# Patient Record
Sex: FEMALE | Employment: FULL TIME | URBAN - METROPOLITAN AREA
[De-identification: names, ages, dates, MRNs, and addresses within clinical notes are randomized per-mention and may not be internally consistent; named-entity substitution may affect disease eponyms.]

---

## 2017-04-10 ENCOUNTER — OFFICE VISIT (OUTPATIENT)
Dept: FAMILY MEDICINE | Facility: CLINIC | Age: 32
End: 2017-04-10
Payer: COMMERCIAL

## 2017-04-10 VITALS
HEIGHT: 65 IN | DIASTOLIC BLOOD PRESSURE: 79 MMHG | TEMPERATURE: 98.1 F | SYSTOLIC BLOOD PRESSURE: 130 MMHG | HEART RATE: 71 BPM | BODY MASS INDEX: 27.16 KG/M2 | WEIGHT: 163 LBS

## 2017-04-10 DIAGNOSIS — S16.1XXA STRAIN OF NECK MUSCLE, INITIAL ENCOUNTER: Primary | ICD-10-CM

## 2017-04-10 PROCEDURE — 99213 OFFICE O/P EST LOW 20 MIN: CPT | Performed by: FAMILY MEDICINE

## 2017-04-10 RX ORDER — CYCLOBENZAPRINE HCL 10 MG
10 TABLET ORAL
Qty: 14 TABLET | Refills: 0 | Status: SHIPPED | OUTPATIENT
Start: 2017-04-10 | End: 2017-06-23

## 2017-04-10 RX ORDER — NAPROXEN 500 MG/1
500 TABLET ORAL 2 TIMES DAILY PRN
Qty: 30 TABLET | Refills: 0 | Status: SHIPPED | OUTPATIENT
Start: 2017-04-10 | End: 2017-07-12

## 2017-04-10 NOTE — PROGRESS NOTES
"  SUBJECTIVE:                                                    Tana Kyle is a 32 year old female who presents to clinic today for the following health issues:     L neck and shoulder pain since yesterday      Onset: yesterday     Description:   Location: left shoulder and L neck   Character:  ache and tight     Intensity: 5-6 /10    Progression of Symptoms: better    Accompanying Signs & Symptoms:  Other symptoms: radiation of pain to upper back    History:   Previous similar pain: no       Precipitating factors:   Trauma or overuse: no     Alleviating factors:  Improved by: nothing       Therapies Tried and outcome: Aleve,ice and heat     Took ibuprofen this morning that helped.   Traveling to [place] tomorrow.     Problem list and histories reviewed & adjusted, as indicated.  Additional history: as documented    Patient Active Problem List   Diagnosis     CARDIOVASCULAR SCREENING; LDL GOAL LESS THAN 160     No past surgical history on file.    Social History   Substance Use Topics     Smoking status: Never Smoker     Smokeless tobacco: Not on file     Alcohol use No     Family History   Problem Relation Age of Onset     DIABETES Maternal Grandmother      Cancer - colorectal Maternal Grandmother      Alzheimer Disease Maternal Grandfather      Coronary Artery Disease No family hx of          BP Readings from Last 3 Encounters:   04/10/17 130/79   09/06/16 140/86   02/24/16 118/72    Wt Readings from Last 3 Encounters:   04/10/17 163 lb (73.9 kg)   09/06/16 168 lb (76.2 kg)   07/22/16 172 lb (78 kg)                    Reviewed and updated as needed this visit by clinical staff  Allergies  Meds       Reviewed and updated as needed this visit by Provider         ROS:  Constitutional, HEENT, cardiovascular, pulmonary, gi and gu systems are negative, except as otherwise noted.    OBJECTIVE:                                                    /79  Pulse 71  Temp 98.1  F (36.7  C) (Oral)  Ht 5' 5\" (1.651 " m)  Wt 163 lb (73.9 kg)  BMI 27.12 kg/m2  Body mass index is 27.12 kg/(m^2).  GENERAL: healthy, alert and no distress  NECK: pt holding her neck towards right side. Tenderness : left cervical paraspinal, left trapezius muscle area. Cervical spine movements cause pain/ discomfort in the left side of neck. Cervical spine movements are restricted due to pain.   Bilateral shoulder exam: normal.   Bilateral upper extremity strength is normal.          ASSESSMENT/PLAN:                                                        ICD-10-CM    1. Strain of neck muscle, initial encounter S16.1XXA cyclobenzaprine (FLEXERIL) 10 MG tablet     naproxen (NAPROSYN) 500 MG tablet     Not to drive for 8 hrs after taking flexeril. Do not take ibuprofen and naproxen together.   Ice pack/ warm pack prn.   Simple neck exercise once pain improves.   F/u prn.     Bell Herron MD  [facility]

## 2017-04-10 NOTE — NURSING NOTE
"Chief Complaint   Patient presents with     Pain     L neck, shoulder pain since yesterday        Initial /79  Pulse 71  Temp 98.1  F (36.7  C) (Oral)  Ht 5' 5\" (1.651 m)  Wt 163 lb (73.9 kg)  BMI 27.12 kg/m2 Estimated body mass index is 27.12 kg/(m^2) as calculated from the following:    Height as of this encounter: 5' 5\" (1.651 m).    Weight as of this encounter: 163 lb (73.9 kg).  Medication Reconciliation: complete  Luciana Ha MA    "

## 2017-04-10 NOTE — MR AVS SNAPSHOT
"              After Visit Summary   4/10/2017    Tana Kyle    MRN: [id number]           Patient Information     Date Of Birth          1985        Visit Information        Provider Department      4/10/2017 2:20 PM Bell Herron MD [facility]        Today's Diagnoses     Strain of neck muscle, initial encounter    -  1       Follow-ups after your visit        Who to contact     If you have questions or need follow up information about today's clinic visit or your schedule please contact [facility] directly at [phone].  Normal or non-critical lab and imaging results will be communicated to you by [employer], letter or phone within 4 business days after the clinic has received the results. If you do not hear from us within 7 days, please contact the clinic through [employer] or phone. If you have a critical or abnormal lab result, we will notify you by phone as soon as possible.  Submit refill requests through [employer] or call your pharmacy and they will forward the refill request to us. Please allow 3 business days for your refill to be completed.          Additional Information About Your Visit        MyChart Information     [employer] gives you secure access to your electronic health record. If you see a primary care provider, you can also send messages to your care team and make appointments. If you have questions, please call your primary care clinic.  If you do not have a primary care provider, please call [phone] and they will assist you.        Care EveryWhere ID     This is your Care EveryWhere ID. This could be used by other organizations to access your [place] medical records  [id number]        Your Vitals Were     Pulse Temperature Height BMI (Body Mass Index)          71 98.1  F (36.7  C) (Oral) 5' 5\" (1.651 m) 27.12 kg/m2         Blood Pressure from Last 3 Encounters:   04/10/17 130/79   09/06/16 140/86   02/24/16 118/72    Weight from " Last 3 Encounters:   04/10/17 163 lb (73.9 kg)   09/06/16 168 lb (76.2 kg)   07/22/16 172 lb (78 kg)              Today, you had the following     No orders found for display         Today's Medication Changes          These changes are accurate as of: 4/10/17  2:57 PM.  If you have any questions, ask your nurse or doctor.               Start taking these medicines.        Dose/Directions    cyclobenzaprine 10 MG tablet   Commonly known as:  FLEXERIL   Used for:  Strain of neck muscle, initial encounter   Started by:  Bell Herron MD        Dose:  10 mg   Take 1 tablet (10 mg) by mouth nightly as needed for muscle spasms   Quantity:  14 tablet   Refills:  0       naproxen 500 MG tablet   Commonly known as:  NAPROSYN   Used for:  Strain of neck muscle, initial encounter   Started by:  Bell Herron MD        Dose:  500 mg   Take 1 tablet (500 mg) by mouth 2 times daily as needed for moderate pain   Quantity:  30 tablet   Refills:  0            Where to get your medicines      These medications were sent to [place] [address] [place] [address] [address] [facility] [address] [facility] [address]     Phone:  [phone]     cyclobenzaprine 10 MG tablet    naproxen 500 MG tablet                Primary Care Provider Office Phone #    [facility] [phone]       [address] [place] [address]        Thank you!     Thank you for choosing [facility]  for your care. Our goal is always to provide you with excellent care. Hearing back from our patients is one way we can continue to improve our services. Please take a few minutes to complete the written survey that you may receive in the mail after your visit with us. Thank you!             Your Updated Medication List - Protect others around you: Learn how to safely use, store and throw away your medicines at www.disposemymeds.org.          This list is accurate as of: 4/10/17  2:57 PM.   Always use your most recent med list.                   Brand Name Dispense Instructions for use    cyclobenzaprine 10 MG tablet    FLEXERIL    14 tablet    Take 1 tablet (10 mg) by mouth nightly as needed for muscle spasms       multivitamin, therapeutic with minerals Tabs tablet      Take 1 tablet by mouth daily       naproxen 500 MG tablet    NAPROSYN    30 tablet    Take 1 tablet (500 mg) by mouth 2 times daily as needed for moderate pain       OMEGA-3 FISH OIL PO

## 2017-06-23 ENCOUNTER — TELEPHONE (OUTPATIENT)
Dept: FAMILY MEDICINE | Facility: CLINIC | Age: 32
End: 2017-06-23

## 2017-06-23 ENCOUNTER — OFFICE VISIT (OUTPATIENT)
Dept: FAMILY MEDICINE | Facility: CLINIC | Age: 32
End: 2017-06-23
Payer: COMMERCIAL

## 2017-06-23 VITALS
BODY MASS INDEX: 26.96 KG/M2 | WEIGHT: 162 LBS | DIASTOLIC BLOOD PRESSURE: 73 MMHG | TEMPERATURE: 97.6 F | SYSTOLIC BLOOD PRESSURE: 129 MMHG | HEART RATE: 72 BPM

## 2017-06-23 DIAGNOSIS — Z30.9 ENCOUNTER FOR CONTRACEPTIVE MANAGEMENT, UNSPECIFIED TYPE: Primary | ICD-10-CM

## 2017-06-23 DIAGNOSIS — Z30.09 ENCOUNTER FOR OTHER GENERAL COUNSELING OR ADVICE ON CONTRACEPTION: ICD-10-CM

## 2017-06-23 DIAGNOSIS — Z00.00 ROUTINE GENERAL MEDICAL EXAMINATION AT A HEALTH CARE FACILITY: Primary | ICD-10-CM

## 2017-06-23 DIAGNOSIS — Z11.3 SCREEN FOR STD (SEXUALLY TRANSMITTED DISEASE): ICD-10-CM

## 2017-06-23 PROCEDURE — 86780 TREPONEMA PALLIDUM: CPT | Performed by: FAMILY MEDICINE

## 2017-06-23 PROCEDURE — 87389 HIV-1 AG W/HIV-1&-2 AB AG IA: CPT | Performed by: FAMILY MEDICINE

## 2017-06-23 PROCEDURE — 87340 HEPATITIS B SURFACE AG IA: CPT | Performed by: FAMILY MEDICINE

## 2017-06-23 PROCEDURE — 86803 HEPATITIS C AB TEST: CPT | Performed by: FAMILY MEDICINE

## 2017-06-23 PROCEDURE — 99395 PREV VISIT EST AGE 18-39: CPT | Performed by: FAMILY MEDICINE

## 2017-06-23 PROCEDURE — 87591 N.GONORRHOEAE DNA AMP PROB: CPT | Performed by: FAMILY MEDICINE

## 2017-06-23 PROCEDURE — 36415 COLL VENOUS BLD VENIPUNCTURE: CPT | Performed by: FAMILY MEDICINE

## 2017-06-23 PROCEDURE — 87491 CHLMYD TRACH DNA AMP PROBE: CPT | Performed by: FAMILY MEDICINE

## 2017-06-23 PROCEDURE — 86706 HEP B SURFACE ANTIBODY: CPT | Performed by: FAMILY MEDICINE

## 2017-06-23 RX ORDER — NORGESTIMATE AND ETHINYL ESTRADIOL 0.25-0.035
1 KIT ORAL DAILY
Qty: 84 TABLET | Refills: 0 | Status: SHIPPED | OUTPATIENT
Start: 2017-06-23 | End: 2017-07-12

## 2017-06-23 NOTE — PROGRESS NOTES
SUBJECTIVE:     CC: Tana Kyle is an 32 year old woman who presents for preventive health visit.     Healthy Habits:    Do you get at least three servings of calcium containing foods daily (dairy, green leafy vegetables, etc.)? yes    Amount of exercise or daily activities, outside of work: yes    Problems taking medications regularly No    Medication side effects: No    Have you had an eye exam in the past two years? yes    Do you see a dentist twice per year? no    Do you have sleep apnea, excessive snoring or daytime drowsiness?no       STD CHECK AND BC DISCUSSION.     Unprotected sexual intercourse last weekend, plan B 5 days ago.   No vaginal symptoms.   Desires IUD.     Today's PHQ-2 Score:   PHQ-2 ( 1999 Pfizer) 2/24/2016 2/5/2016   Q1: Little interest or pleasure in doing things 0 0   Q2: Feeling down, depressed or hopeless 0 0   PHQ-2 Score 0 0       Abuse: Current or Past(Physical, Sexual or Emotional)- No  Do you feel safe in your environment - Yes    Social History   Substance Use Topics     Smoking status: Never Smoker     Smokeless tobacco: Not on file     Alcohol use No     The patient does not drink >3 drinks per day nor >7 drinks per week.    Recent Labs   Lab Test  02/24/16   0937   CHOL  139   HDL  63   LDL  69   TRIG  34   NHDL  76       Reviewed orders with patient.  Reviewed health maintenance and updated orders accordingly - Yes    Mammo Decision Support:  Mammogram not appropriate for this patient based on age.    Pertinent mammograms are reviewed under the imaging tab.  History of abnormal Pap smear: NO - age 30-65 PAP every 5 years with negative HPV co-testing recommended    Reviewed and updated as needed this visit by clinical staff         Reviewed and updated as needed this visit by Provider            ROS:  C: NEGATIVE for fever, chills, change in weight  I: NEGATIVE for worrisome rashes, moles or lesions  E: NEGATIVE for vision changes or irritation  ENT: NEGATIVE for ear, mouth  and throat problems  R: NEGATIVE for significant cough or SOB  B: NEGATIVE for masses, tenderness or discharge  CV: NEGATIVE for chest pain, palpitations or peripheral edema  GI: NEGATIVE for nausea, abdominal pain, heartburn, or change in bowel habits  : NEGATIVE for unusual urinary or vaginal symptoms. Periods are regular.  M: NEGATIVE for significant arthralgias or myalgia  N: NEGATIVE for weakness, dizziness or paresthesias  P: NEGATIVE for changes in mood or affect    Problem list, Medication list, Allergies, and Medical/Social/Surgical histories reviewed in [facility] and updated as appropriate.  OBJECTIVE:     /73  Pulse 72  Temp 97.6  F (36.4  C) (Oral)  Wt 162 lb (73.5 kg)  LMP 05/29/2017  BMI 26.96 kg/m2  EXAM:  GENERAL: healthy, alert and no distress  EYES: Eyes grossly normal to inspection, PERRL and conjunctivae and sclerae normal  HENT: ear canals and TM's normal, nose and mouth without ulcers or lesions  NECK: no adenopathy, no asymmetry, masses, or scars and thyroid normal to palpation  RESP: lungs clear to auscultation - no rales, rhonchi or wheezes  BREAST: normal without masses, tenderness or nipple discharge and no palpable axillary masses or adenopathy  CV: regular rate and rhythm, normal S1 S2, no S3 or S4, no murmur, click or rub, no peripheral edema and peripheral pulses strong  ABDOMEN: soft, nontender, no hepatosplenomegaly, no masses and bowel sounds normal  MS: no gross musculoskeletal defects noted, no edema  SKIN: no suspicious lesions or rashes  NEURO: Normal strength and tone, mentation intact and speech normal  PSYCH: mentation appears normal, affect normal/bright    ASSESSMENT/PLAN:         ICD-10-CM    1. Routine general medical examination at a health care facility Z00.00 norgestimate-ethinyl estradiol (ORTHO-CYCLEN, SPRINTEC) 0.25-35 MG-MCG per tablet   2. Encounter for other general counseling or advice on contraception Z30.09 OB/GYN REFERRAL     norgestimate-ethinyl  "estradiol (ORTHO-CYCLEN, SPRINTEC) 0.25-35 MG-MCG per tablet   3. Screen for STD (sexually transmitted disease) Z11.3 NEISSERIA GONORRHOEA PCR     CHLAMYDIA TRACHOMATIS PCR     HIV Antigen Antibody Combo     Hepatitis B surface antigen     Hepatitis B Surface Antibody     Hepatitis C Screen Reflex to HCV RNA Quant and Genotype     Anti Treponema       Start OCPills, condom use or abstinence from sex for first 2 weeks of BC pills.   Home pregnancy test is periods do not resume before starting next months pill pack.     COUNSELING:   Reviewed preventive health counseling, as reflected in patient instructions       reports that she has never smoked. She does not have any smokeless tobacco history on file.    Estimated body mass index is 27.12 kg/(m^2) as calculated from the following:    Height as of 4/10/17: 5' 5\" (1.651 m).    Weight as of 4/10/17: 163 lb (73.9 kg).       Counseling Resources:  ATP IV Guidelines  Pooled Cohorts Equation Calculator  Breast Cancer Risk Calculator  FRAX Risk Assessment  ICSI Preventive Guidelines  Dietary Guidelines for Americans, 2010  USDA's MyPlate  ASA Prophylaxis  Lung CA Screening    Bell Herron MD  [facility]  "

## 2017-06-23 NOTE — MR AVS SNAPSHOT
After Visit Summary   6/23/2017    Tana Kyle    MRN: [id number]           Patient Information     Date Of Birth          1985        Visit Information        Provider Department      6/23/2017 9:00 AM Bell Herron MD [facility]        Today's Diagnoses     Routine general medical examination at a health care facility    -  1    Encounter for other general counseling or advice on contraception        Screen for STD (sexually transmitted disease)          Care Instructions      Preventive Health Recommendations  Female Ages 26 - 39  Yearly exam:   See your health care provider every year in order to    Review health changes.     Discuss preventive care.      Review your medicines if you your doctor has prescribed any.    Until age 30: Get a Pap test every three years (more often if you have had an abnormal result).    After age 30: Talk to your doctor about whether you should have a Pap test every 3 years or have a Pap test with HPV screening every 5 years.   You do not need a Pap test if your uterus was removed (hysterectomy) and you have not had cancer.  You should be tested each year for STDs (sexually transmitted diseases), if you're at risk.   Talk to your provider about how often to have your cholesterol checked.  If you are at risk for diabetes, you should have a diabetes test (fasting glucose).  Shots: Get a flu shot each year. Get a tetanus shot every 10 years.   Nutrition:     Eat at least 5 servings of fruits and vegetables each day.    Eat whole-grain bread, whole-wheat pasta and brown rice instead of white grains and rice.    Talk to your provider about Calcium and Vitamin D.     Lifestyle    Exercise at least 150 minutes a week (30 minutes a day, 5 days of the week). This will help you control your weight and prevent disease.    Limit alcohol to one drink per day.    No smoking.     Wear sunscreen to prevent skin cancer.    See your dentist every  six months for an exam and cleaning.      You can start taking your oral contraceptive pills from Today. Use condoms as well for next 2 weeks .             Follow-ups after your visit        Additional Services     OB/GYN REFERRAL       Your provider has referred you to:  [facility]: [facility] [place] [phone]   [email or web address]    Please be aware that coverage of these services is subject to the terms and limitations of your health insurance plan.  Call member services at your health plan with any benefit or coverage questions.      Please bring the following with you to your appointment:    (1) Any X-Rays, CTs or MRIs which have been performed.  Contact the facility where they were done to arrange for  prior to your scheduled appointment.   (2) List of current medications   (3) This referral request   (4) Any documents/labs given to you for this referral                  Who to contact     If you have questions or need follow up information about today's clinic visit or your schedule please contact [facility] directly at [phone].  Normal or non-critical lab and imaging results will be communicated to you by MyChart, letter or phone within 4 business days after the clinic has received the results. If you do not hear from us within 7 days, please contact the clinic through [employer] or phone. If you have a critical or abnormal lab result, we will notify you by phone as soon as possible.  Submit refill requests through [employer] or call your pharmacy and they will forward the refill request to us. Please allow 3 business days for your refill to be completed.          Additional Information About Your Visit        [employer] Information     [employer] gives you secure access to your electronic health record. If you see a primary care provider, you can also send messages to your care team and make appointments. If you have questions, please call your  primary care clinic.  If you do not have a primary care provider, please call [phone] and they will assist you.        Care EveryWhere ID     This is your Care EveryWhere ID. This could be used by other organizations to access your [place] medical records  [id number]        Your Vitals Were     Pulse Temperature Last Period BMI (Body Mass Index)          72 97.6  F (36.4  C) (Oral) 05/29/2017 26.96 kg/m2         Blood Pressure from Last 3 Encounters:   06/23/17 129/73   04/10/17 130/79   09/06/16 140/86    Weight from Last 3 Encounters:   06/23/17 162 lb (73.5 kg)   04/10/17 163 lb (73.9 kg)   09/06/16 168 lb (76.2 kg)              We Performed the Following     Anti Treponema     CHLAMYDIA TRACHOMATIS PCR     Hepatitis B Surface Antibody     Hepatitis B surface antigen     Hepatitis C Screen Reflex to HCV RNA Quant and Genotype     HIV Antigen Antibody Combo     NEISSERIA GONORRHOEA PCR     OB/GYN REFERRAL          Today's Medication Changes          These changes are accurate as of: 6/23/17 10:10 AM.  If you have any questions, ask your nurse or doctor.               Start taking these medicines.        Dose/Directions    norgestimate-ethinyl estradiol 0.25-35 MG-MCG per tablet   Commonly known as:  ORTHO-CYCLEN, SPRINTEC   Used for:  Routine general medical examination at a health care facility, Encounter for other general counseling or advice on contraception   Started by:  Bell Herron MD        Dose:  1 tablet   Take 1 tablet by mouth daily   Quantity:  84 tablet   Refills:  0            Where to get your medicines      These medications were sent to [place] [id number] [facility] [place] - [address]  [address] [place] [address]     Phone:  [phone]     norgestimate-ethinyl estradiol 0.25-35 MG-MCG per tablet                Primary Care Provider Office Phone # Fax #    Bell Herron [phone]       [facility] [address]  [place]  [facility] [address]        Equal Access to Services     [facility] HospitalIRLANDA : Hadii lynette wallace davidebony Deanaali, wasarbjitda luqadaha, qaybta kaalmada nina, wilfrid rossi. So [facility] [phone].    ATENCIÓN: Si habla español, tiene a gómez disposición servicios gratuitos de asistencia lingüística. Yoanna al [phone].    We comply with applicable federal civil rights laws and [place] laws. We do not discriminate on the basis of race, color, national origin, age, disability sex, sexual orientation or gender identity.            Thank you!     Thank you for choosing [facility]  for your care. Our goal is always to provide you with excellent care. Hearing back from our patients is one way we can continue to improve our services. Please take a few minutes to complete the written survey that you may receive in the mail after your visit with us. Thank you!             Your Updated Medication List - Protect others around you: Learn how to safely use, store and throw away your medicines at www.disposemymeds.org.          This list is accurate as of: 6/23/17 10:10 AM.  Always use your most recent med list.                   Brand Name Dispense Instructions for use Diagnosis    multivitamin, therapeutic with minerals Tabs tablet      Take 1 tablet by mouth daily        naproxen 500 MG tablet    NAPROSYN    30 tablet    Take 1 tablet (500 mg) by mouth 2 times daily as needed for moderate pain    Strain of neck muscle, initial encounter       norgestimate-ethinyl estradiol 0.25-35 MG-MCG per tablet    ORTHO-CYCLEN, SPRINTEC    84 tablet    Take 1 tablet by mouth daily    Routine general medical examination at a health care facility, Encounter for other general counseling or advice on contraception       OMEGA-3 FISH OIL PO

## 2017-06-23 NOTE — PATIENT INSTRUCTIONS

## 2017-06-23 NOTE — TELEPHONE ENCOUNTER
Pt called wondering if the referral to see an OBGYN would be valid for a different [place] location as it specifies that the referral was for the [place] location. Pt would prefer to see a female as the provider at [place] is a male. She would like a call back if possible anytime after 3:30PM.    Fei Gomez

## 2017-06-23 NOTE — NURSING NOTE
"Chief Complaint   Patient presents with     Physical     BC discussion      STD       Initial /73  Pulse 72  Temp 97.6  F (36.4  C) (Oral)  Wt 162 lb (73.5 kg)  LMP 05/29/2017  BMI 26.96 kg/m2 Estimated body mass index is 26.96 kg/(m^2) as calculated from the following:    Height as of 4/10/17: 5' 5\" (1.651 m).    Weight as of this encounter: 162 lb (73.5 kg).  Medication Reconciliation: complete  Luciana Ha MA    "

## 2017-06-24 LAB — T PALLIDUM IGG+IGM SER QL: NEGATIVE

## 2017-06-25 LAB
C TRACH DNA SPEC QL NAA+PROBE: NORMAL
N GONORRHOEA DNA SPEC QL NAA+PROBE: NORMAL
SPECIMEN SOURCE: NORMAL
SPECIMEN SOURCE: NORMAL

## 2017-06-26 LAB
HBV SURFACE AB SERPL IA-ACNC: ABNORMAL M[IU]/ML
HBV SURFACE AG SERPL QL IA: NONREACTIVE
HCV AB SERPL QL IA: NORMAL
HIV 1+2 AB+HIV1 P24 AG SERPL QL IA: NORMAL

## 2017-06-26 NOTE — TELEPHONE ENCOUNTER
Referral placed in for various [place] locations, pt to call and schedule appointment with female provider. If they do not have one available, I believe,  should be able to transfer her to the location that has female provider.     Bell Herron MD.   Family Physician.  [facility].

## 2017-06-26 NOTE — TELEPHONE ENCOUNTER
Informed patient referral has been placed and she can contact clinic to schedule appointment. PERNELL Lacy

## 2017-06-26 NOTE — TELEPHONE ENCOUNTER
Please see message below, patient would like OB/GYN referral re-done to allow different [facility] locations, preferably one with a women practitioner.    Routed to PCP.    Britney Abdalla RN  [facility]

## 2017-06-26 NOTE — PROGRESS NOTES
Dear Tana Kyle,     Your recent labs are NEGATIVE for STDs.     You are immunized against Hepatitis B.     Bell Herron MD.   Family Physician.  [facility].

## 2017-07-12 ENCOUNTER — OFFICE VISIT (OUTPATIENT)
Dept: FAMILY MEDICINE | Facility: CLINIC | Age: 32
End: 2017-07-12
Payer: COMMERCIAL

## 2017-07-12 VITALS
WEIGHT: 165 LBS | TEMPERATURE: 99.4 F | SYSTOLIC BLOOD PRESSURE: 121 MMHG | HEART RATE: 66 BPM | BODY MASS INDEX: 27.46 KG/M2 | OXYGEN SATURATION: 100 % | DIASTOLIC BLOOD PRESSURE: 76 MMHG

## 2017-07-12 DIAGNOSIS — H65.23 BILATERAL CHRONIC SEROUS OTITIS MEDIA: Primary | ICD-10-CM

## 2017-07-12 PROCEDURE — 99213 OFFICE O/P EST LOW 20 MIN: CPT | Performed by: INTERNAL MEDICINE

## 2017-07-12 RX ORDER — FLUTICASONE PROPIONATE 50 MCG
1-2 SPRAY, SUSPENSION (ML) NASAL DAILY
Qty: 3 BOTTLE | Refills: 11 | Status: SHIPPED | OUTPATIENT
Start: 2017-07-12 | End: 2020-01-03

## 2017-07-12 RX ORDER — MECLIZINE HYDROCHLORIDE 25 MG/1
25 TABLET ORAL EVERY 6 HOURS PRN
Qty: 30 TABLET | Refills: 1 | Status: SHIPPED | OUTPATIENT
Start: 2017-07-12 | End: 2018-02-27

## 2017-07-12 ASSESSMENT — PAIN SCALES - GENERAL: PAINLEVEL: MODERATE PAIN (4)

## 2017-07-12 NOTE — PROGRESS NOTES
SUBJECTIVE:                                                    Tana Kyle is a 32 year old female who presents to clinic today for the following health issues:  Vertigo present  Flying during this time   Niece.   No water.     Recurrent Otitis.   No tube in the ear.   Vertigo.     Migraines ( many years ago)     ENT Symptoms             Symptoms: cc Present Absent Comment   Fever/Chills   x    Fatigue   x    Muscle Aches   x    Eye Irritation   x    Sneezing   x    Nasal Porfirio/Drg   x    Sinus Pressure/Pain   x    Loss of smell   x    Dental pain   x    Sore Throat   x    Swollen Glands   x    Ear Pain/Fullness  x  Right ear, doesn't feels normal   Cough   x    Wheeze   x    Chest Pain   x    Shortness of breath   x    Rash   x    Other  x  Dizziness     Symptom duration:  4 days    Symptom severity:  moderate   Treatments tried:  Nothing   Contacts:  Nephew has strep             Problem list and histories reviewed & adjusted, as indicated.  Additional history: as documented    Patient Active Problem List   Diagnosis     CARDIOVASCULAR SCREENING; LDL GOAL LESS THAN 160     History reviewed. No pertinent surgical history.    Social History   Substance Use Topics     Smoking status: Never Smoker     Smokeless tobacco: Not on file     Alcohol use No     Family History   Problem Relation Age of Onset     DIABETES Maternal Grandmother      Cancer - colorectal Maternal Grandmother      Alzheimer Disease Maternal Grandfather      Coronary Artery Disease No family hx of          Current Outpatient Prescriptions   Medication Sig Dispense Refill     meclizine (ANTIVERT) 25 MG tablet Take 1 tablet (25 mg) by mouth every 6 hours as needed for dizziness 30 tablet 1     fluticasone (FLONASE) 50 MCG/ACT spray Spray 1-2 sprays into both nostrils daily 3 Bottle 11     multivitamin, therapeutic with minerals (THERA-VIT-M) TABS Take 1 tablet by mouth daily       Omega-3 Fatty Acids (OMEGA-3 FISH OIL PO)        No Known  Allergies  Recent Labs   Lab Test  02/24/16   0937  05/25/10   1053   LDL  69   --    HDL  63   --    TRIG  34   --    TSH   --   1.57        Reviewed and updated as needed this visit by clinical staff  Tobacco  Allergies  Meds  Med Hx  Surg Hx  Fam Hx  Soc Hx      Reviewed and updated as needed this visit by Provider         ROS:  Constitutional, HEENT, cardiovascular, pulmonary, gi and gu systems are negative, except as otherwise noted.    OBJECTIVE:     /76 (BP Location: Left arm, Patient Position: Chair, Cuff Size: Adult Regular)  Pulse 66  Temp 99.4  F (37.4  C) (Oral)  Wt 165 lb (74.8 kg)  LMP 05/29/2017  SpO2 100%  Breastfeeding? No  BMI 27.46 kg/m2  Body mass index is 27.46 kg/(m^2).  GENERAL: healthy, alert and no distress  NECK: no adenopathy, no asymmetry, masses, or scars and thyroid normal to palpation  Tm bilateral serrous otitis - no redness no pus    RESP: lungs clear to auscultation - no rales, rhonchi or wheezes  CV: regular rate and rhythm, normal S1 S2, no S3 or S4, no murmur, click or rub, no peripheral edema and peripheral pulses strong  ABDOMEN: soft, nontender, no hepatosplenomegaly, no masses and bowel sounds normal  MS: no gross musculoskeletal defects noted, no edema    Diagnostic Test Results:  Results for orders placed or performed in visit on 06/23/17   HIV Antigen Antibody Combo   Result Value Ref Range    HIV Antigen Antibody Combo  NR     Nonreactive   HIV-1 p24 Ag & HIV-1/HIV-2 Ab Not Detected     Hepatitis B surface antigen   Result Value Ref Range    Hep B Surface Agn Nonreactive NR   Hepatitis B Surface Antibody   Result Value Ref Range    Hepatitis B Surface Antibody (H) <8.00 m[IU]/mL     >1000.00  Reactive, Patient is considered to be immune to infection with hepatitis B when   the value is greater than or equal to 12.0 m[IU]/mL.     Hepatitis C Screen Reflex to HCV RNA Quant and Genotype   Result Value Ref Range    Hepatitis C Antibody  NR     Nonreactive    Assay performance characteristics have not been established for newborns,   infants, and children     Anti Treponema   Result Value Ref Range    Treponema pallidum Antibody Negative NEG   NEISSERIA GONORRHOEA PCR   Result Value Ref Range    Specimen Descrip Urine     N Gonorrhea PCR  NEG     Negative   Negative for N. gonorrhoeae rRNA by transcription mediated amplification.   A negative result by transcription mediated amplification does not preclude the   presence of N. gonorrhoeae infection because results are dependent on proper   and adequate collection, absence of inhibitors, and sufficient rRNA to be   detected.     CHLAMYDIA TRACHOMATIS PCR   Result Value Ref Range    Specimen Description Urine     Chlamydia Trachomatis PCR  NEG     Negative   Negative for C. trachomatis rRNA by transcription mediated amplification.   A negative result by transcription mediated amplification does not preclude the   presence of C. trachomatis infection because results are dependent on proper   and adequate collection, absence of inhibitors, and sufficient rRNA to be   detected.         ASSESSMENT/PLAN:       ICD-10-CM    1. Bilateral chronic serous otitis media H65.23 meclizine (ANTIVERT) 25 MG tablet     fluticasone (FLONASE) 50 MCG/ACT spray             Anjel Gutierrez MD  [facility]

## 2017-07-12 NOTE — MR AVS SNAPSHOT
After Visit Summary   7/12/2017    Tana Kyle    MRN: [id number]           Patient Information     Date Of Birth          1985        Visit Information        Provider Department      7/12/2017 10:20 AM Anjel Gutierrez MD [facility]        Today's Diagnoses     Bilateral chronic serous otitis media    -  1       Follow-ups after your visit        Your next 10 appointments already scheduled     Jul 14, 2017 11:15 AM CDT   SHORT with Delia Hunter MD   [facility] [place] [facility] [place] [facility]    [address] [place] [id number] [phone]              Who to contact     If you have questions or need follow up information about today's clinic visit or your schedule please contact [facility] directly at [phone].  Normal or non-critical lab and imaging results will be communicated to you by MyChart, letter or phone within 4 business days after the clinic has received the results. If you do not hear from us within 7 days, please contact the clinic through MyChart or phone. If you have a critical or abnormal lab result, we will notify you by phone as soon as possible.  Submit refill requests through [employer] or call your pharmacy and they will forward the refill request to us. Please allow 3 business days for your refill to be completed.          Additional Information About Your Visit        MyChart Information     [employer] gives you secure access to your electronic health record. If you see a primary care provider, you can also send messages to your care team and make appointments. If you have questions, please call your primary care clinic.  If you do not have a primary care provider, please call [phone] and they will assist you.        Care EveryWhere ID     This is your Care EveryWhere ID. This could be used by other organizations to access your [place] medical records  [id number]         Your Vitals Were     Pulse Temperature Last Period Pulse Oximetry Breastfeeding? BMI (Body Mass Index)    66 99.4  F (37.4  C) (Oral) 05/29/2017 100% No 27.46 kg/m2       Blood Pressure from Last 3 Encounters:   07/12/17 121/76   06/23/17 129/73   04/10/17 130/79    Weight from Last 3 Encounters:   07/12/17 165 lb (74.8 kg)   06/23/17 162 lb (73.5 kg)   04/10/17 163 lb (73.9 kg)              Today, you had the following     No orders found for display         Today's Medication Changes          These changes are accurate as of: 7/12/17 10:53 AM.  If you have any questions, ask your nurse or doctor.               Start taking these medicines.        Dose/Directions    fluticasone 50 MCG/ACT spray   Commonly known as:  FLONASE   Used for:  Bilateral chronic serous otitis media   Started by:  Anjel Gutierrez MD        Dose:  1-2 spray   Spray 1-2 sprays into both nostrils daily   Quantity:  3 Bottle   Refills:  11       meclizine 25 MG tablet   Commonly known as:  ANTIVERT   Used for:  Bilateral chronic serous otitis media   Started by:  Anjel Gutierrez MD        Dose:  25 mg   Take 1 tablet (25 mg) by mouth every 6 hours as needed for dizziness   Quantity:  30 tablet   Refills:  1            Where to get your medicines      These medications [facility] [place] [address] [facility] [address]  [address] [facility] [address]     Phone:  [phone]     fluticasone 50 MCG/ACT spray    meclizine 25 MG tablet                Primary Care Provider Office Phone # Fax #    Bell Herron [phone]       [facility] [address]  [facility] [place] [address]        Equal Access to Services     ENDY BOYCE AH: Jenae Jimenez, nino olivo, austen kaalmada nina, wilfrid rossi. So [facility] [phone].    ATENCIÓN: Si habla español, tiene a gómez disposición servicios gratuitos de asistencia lingüística. Llame al  [phone].    We comply with applicable federal civil rights laws and [place] laws. We do not discriminate on the basis of race, color, national origin, age, disability sex, sexual orientation or gender identity.            Thank you!     Thank you for choosing [facility]  for your care. Our goal is always to provide you with excellent care. Hearing back from our patients is one way we can continue to improve our services. Please take a few minutes to complete the written survey that you may receive in the mail after your visit with us. Thank you!             Your Updated Medication List - Protect others around you: Learn how to safely use, store and throw away your medicines at www.disposemymeds.org.          This list is accurate as of: 7/12/17 10:53 AM.  Always use your most recent med list.                   Brand Name Dispense Instructions for use Diagnosis    fluticasone 50 MCG/ACT spray    FLONASE    3 Bottle    Spray 1-2 sprays into both nostrils daily    Bilateral chronic serous otitis media       meclizine 25 MG tablet    ANTIVERT    30 tablet    Take 1 tablet (25 mg) by mouth every 6 hours as needed for dizziness    Bilateral chronic serous otitis media       multivitamin, therapeutic with minerals Tabs tablet      Take 1 tablet by mouth daily        OMEGA-3 FISH OIL PO

## 2017-07-12 NOTE — NURSING NOTE
"Chief Complaint   Patient presents with     Ear Problem       Initial /76 (BP Location: Left arm, Patient Position: Chair, Cuff Size: Adult Regular)  Pulse 66  Temp 99.4  F (37.4  C) (Oral)  Wt 165 lb (74.8 kg)  LMP 05/29/2017  SpO2 100%  Breastfeeding? No  BMI 27.46 kg/m2 Estimated body mass index is 27.46 kg/(m^2) as calculated from the following:    Height as of 4/10/17: 5' 5\" (1.651 m).    Weight as of this encounter: 165 lb (74.8 kg).  Medication Reconciliation: complete   Dina Vang MA      "

## 2017-07-14 ENCOUNTER — OFFICE VISIT (OUTPATIENT)
Dept: OBGYN | Facility: CLINIC | Age: 32
End: 2017-07-14
Payer: COMMERCIAL

## 2017-07-14 VITALS
HEART RATE: 78 BPM | SYSTOLIC BLOOD PRESSURE: 136 MMHG | DIASTOLIC BLOOD PRESSURE: 83 MMHG | BODY MASS INDEX: 27.46 KG/M2 | TEMPERATURE: 98 F | WEIGHT: 165 LBS

## 2017-07-14 DIAGNOSIS — Z30.430 ENCOUNTER FOR IUD INSERTION: Primary | ICD-10-CM

## 2017-07-14 LAB — BETA HCG QUAL IFA URINE: NEGATIVE

## 2017-07-14 PROCEDURE — 84703 CHORIONIC GONADOTROPIN ASSAY: CPT | Performed by: OBSTETRICS & GYNECOLOGY

## 2017-07-14 PROCEDURE — 58300 INSERT INTRAUTERINE DEVICE: CPT | Performed by: OBSTETRICS & GYNECOLOGY

## 2017-07-14 NOTE — NURSING NOTE
"Chief Complaint   Patient presents with     IUD     NDC: 74848-755-03 LOT#: [id number] EXP: 02/20       Initial /83  Pulse 78  Temp 98  F (36.7  C) (Oral)  Wt 165 lb (74.8 kg)  LMP 06/28/2017 (Exact Date)  Breastfeeding? No  BMI 27.46 kg/m2 Estimated body mass index is 27.46 kg/(m^2) as calculated from the following:    Height as of 4/10/17: 5' 5\" (1.651 m).    Weight as of this encounter: 165 lb (74.8 kg).  BP completed using cuff size: regular    No obstetric history on file.    The following HM Due: NONE      The following patient reported/Care Every where data was sent to:  P ABSTRACT QUALITY INITIATIVES [[id number]]  na     n/a             "

## 2017-07-14 NOTE — PROGRESS NOTES
GYN clinic visit  7/14/2017    CC: IUD insertion    HPI:   Tana Kyle is a 32 year old G0 who presents to clinic today for an IUD insertion. Interested in Mirena IUD bc her sister has one.  She has used condoms for contraception in the past. She has a history of no STIs and most recently had negative testing for GC/chlamydia on 6/23/17. She denies current abnormal vaginal discharge. Her LMP was Patient's last menstrual period was 06/28/2017 (exact date).. Her menses are regular, every 28-30 days for 4-5 days of moderte flow. Last Pap smear was 2/4/16.     Past Medical History:   Diagnosis Date     Eczema      Migraines     Immitrex     Recurrent acute otitis media      History reviewed. No pertinent surgical history.    Current Outpatient Prescriptions   Medication     levonorgestrel (MIRENA, 52 MG,) 20 MCG/24HR IUD     meclizine (ANTIVERT) 25 MG tablet     fluticasone (FLONASE) 50 MCG/ACT spray     multivitamin, therapeutic with minerals (THERA-VIT-M) TABS     Omega-3 Fatty Acids (OMEGA-3 FISH OIL PO)     No current facility-administered medications for this visit.       No Known Allergies    Social history:  Works as  for [employer]. Feels safe.   Social History   Substance Use Topics     Smoking status: Never Smoker     Smokeless tobacco: Not on file     Alcohol use No     Family History   Problem Relation Age of Onset     DIABETES Maternal Grandmother      Cancer - colorectal Maternal Grandmother      Alzheimer Disease Maternal Grandfather      Coronary Artery Disease No family hx of        OBJECTIVE:  Vitals:    07/14/17 1105   BP: 136/83   Pulse: 78   Temp: 98  F (36.7  C)   TempSrc: Oral   Weight: 165 lb (74.8 kg)     Body mass index is 27.46 kg/(m^2).    Gen: alert, oriented, no distress, cooperative and pleasant  Abd: soft, nontender, nondistended, no masses  : normal external genitalia without lesions or abnormalities. Normal Bartholin's, normal [place]'s, normal urethra. Normal vaginal  mucosa, no abnormal discharge or lesions. Cervix appears nulliparous, no lesions or erythema. Bimanual exam reveals 8 week sized anteverted mobile uterus, no cervical motion tenderness, no uterine tenderness, no adnexal masses.    PROCEDURE:  Patient has verbalized understanding of risks and benefits. She was counseled on risks of infection, bleeding, uterine perforation, cervical laceration, expulsion, overall risk of pregnancy 2 in 1000, if she does get pregnant that there is an increased risk of ectopic pregnancy. All questions answered. She has signed the consent form.    Urine pregnancy test was negative.      A medium gunner speculum was placed in the vagina with good visualization of the cervix.  The cervix was then swabbed with a betadine x3.  Tenaculum was placed at the 12 o'clock position on the cervix and the uterus sounded to 8.5cm.  The Mirena  IUD was then placed in the usual fashion under sterile technique without difficulty.  Strings were clipped about 2-3 cm from the cervical os.  Tenaculum was removed and cervix was hemostatic. There were no complications. The patient tolerated the procedure well.  NDC: 72293-443-60 LOT#: [id number] EXP: 02/20    ASSESSMENT:   Tana Kyle is a 32 year old nulligravid female who had a Mirena IUD inserted today without complication.    PLAN:  1. Encounter for IUD insertion  - Beta HCG qual IFA urine  - INSERTION INTRAUTERINE DEVICE  - HC LEVONORGESTREL IU 52MG 5 YR  - levonorgestrel (MIRENA, 52 MG,) 20 MCG/24HR IUD; 1 each (20 mcg) by Intrauterine route once for 1 dose  Dispense: 1 each; Refill: 0      The patient should feel for the IUD strings in 2 weeks.  If unable to locate them, she should return to clinic for a speculum examination for confirmation that the IUD is in place. Bleeding pattern of this particular IUD was discussed with the patient. She is aware that the IUD will need to be removed in 5 years or PRN.  She is to return to clinic for her next annual  or PRN.    In addition to procedure, over 50% of this 20 min appointment was spent in history taking and counseling regarding IUDs. Discussed that intrauterine device is a form of long acting reversible contraception. Reviewed types of IUDs - copper and levonorgestrel containing - their respective mechanisms of action, bleeding profiles and duration. Discussed that copper IUD is effective immediately and can be used as emergency contraception. Discussed that with levonorgestrel IUD that ovarian cysts may occur but usually disappear.Discussed insertion process and what to expect immediately after. Reviewed contraindications to IUD including pregnancy, uterine anomaly, infection, known or suspected breast cancer or other progestin-sensitive cancer, liver disease, allergy. She was counseled on risks of infection, bleeding, uterine perforation, cervical laceration, expulsion. Reviewed that overall risk of pregnancy 2 in 1000, if she does get pregnant that there is an increased risk of ectopic pregnancy.      Delia Hunter MD

## 2017-07-14 NOTE — MR AVS SNAPSHOT
After Visit Summary   7/14/2017    Tana Kyle    MRN: [id number]           Patient Information     Date Of Birth          1985        Visit Information        Provider Department      7/14/2017 11:15 AM Delia Hunter MD [facility]        Today's Diagnoses     Encounter for IUD insertion    -  1       Follow-ups after your visit        Who to contact     If you have questions or need follow up information about today's clinic visit or your schedule please contact [facility] directly at [phone].  Normal or non-critical lab and imaging results will be communicated to you by [employer], letter or phone within 4 business days after the clinic has received the results. If you do not hear from us within 7 days, please contact the clinic through [employer] or phone. If you have a critical or abnormal lab result, we will notify you by phone as soon as possible.  Submit refill requests through [employer]pipe or call your pharmacy and they will forward the refill request to us. Please allow 3 business days for your refill to be completed.          Additional Information About Your Visit        MyChart Information     [employer]pipe gives you secure access to your electronic health record. If you see a primary care provider, you can also send messages to your care team and make appointments. If you have questions, please call your primary care clinic.  If you do not have a primary care provider, please call [phone] and they will assist you.        Care EveryWhere ID     This is your Care EveryWhere ID. This could be used by other organizations to access your [facility] medical records  [id number]        Your Vitals Were     Pulse Temperature Last Period Breastfeeding? BMI (Body Mass Index)       78 98  F (36.7  C) (Oral) 06/28/2017 (Exact Date) No 27.46 kg/m2        Blood Pressure from Last 3 Encounters:   07/14/17 136/83   07/12/17 121/76   06/23/17 129/73    Weight from  Last 3 Encounters:   07/14/17 165 lb (74.8 kg)   07/12/17 165 lb (74.8 kg)   06/23/17 162 lb (73.5 kg)              We Performed the Following     Beta HCG qual IFA urine     HC LEVONORGESTREL IU 52MG 5 YR     INSERTION INTRAUTERINE DEVICE          Today's Medication Changes          These changes are accurate as of: 7/14/17  4:46 PM.  If you have any questions, ask your nurse or doctor.               Start taking these medicines.        Dose/Directions    levonorgestrel 20 MCG/24HR IUD   Commonly known as:  MIRENA (52 MG)   Used for:  Encounter for IUD insertion   Started by:  Delia Hunter MD        Dose:  1 each   1 each (20 mcg) by Intrauterine route once for 1 dose   Quantity:  1 each   Refills:  0            Where to get your medicines      Some of these will need a paper prescription and others can be bought over the counter.  Ask your nurse if you have questions.     You don't need a prescription for these medications     levonorgestrel 20 MCG/24HR IUD                Primary Care Provider Office Phone # Fax #    Bell Juan Herron [phone]       [facility] [address] [facility] [place] [address]        Equal Access to Services     ENDY BOYCE : Jenae chavez Somargarita, wasarbjitda geovani, qaybta kaalmada ademichaelda, wilfrid rossi. So [facility] [phone].    ATENCIÓN: Si habla español, tiene a gómez disposición servicios gratuitos de asistencia lingüística. Llame al [phone].    We comply with applicable federal civil rights laws and [place] laws. We do not discriminate on the basis of race, color, national origin, age, disability sex, sexual orientation or gender identity.            Thank you!     Thank you for choosing [facility]  for your care. Our goal is always to provide you with excellent care. Hearing back from our patients is one way we can continue to improve our services. Please take a few minutes  to complete the written survey that you may receive in the mail after your visit with us. Thank you!             Your Updated Medication List - Protect others around you: Learn how to safely use, store and throw away your medicines at www.disposemymeds.org.          This list is accurate as of: 7/14/17  4:46 PM.  Always use your most recent med list.                   Brand Name Dispense Instructions for use Diagnosis    fluticasone 50 MCG/ACT spray    FLONASE    3 Bottle    Spray 1-2 sprays into both nostrils daily    Bilateral chronic serous otitis media       levonorgestrel 20 MCG/24HR IUD    MIRENA (52 MG)    1 each    1 each (20 mcg) by Intrauterine route once for 1 dose    Encounter for IUD insertion       meclizine 25 MG tablet    ANTIVERT    30 tablet    Take 1 tablet (25 mg) by mouth every 6 hours as needed for dizziness    Bilateral chronic serous otitis media       multivitamin, therapeutic with minerals Tabs tablet      Take 1 tablet by mouth daily        OMEGA-3 FISH OIL PO

## 2017-07-19 ENCOUNTER — OFFICE VISIT (OUTPATIENT)
Dept: OTOLARYNGOLOGY | Facility: CLINIC | Age: 32
End: 2017-07-19
Payer: COMMERCIAL

## 2017-07-19 VITALS — WEIGHT: 165 LBS | HEIGHT: 65 IN | RESPIRATION RATE: 16 BRPM | BODY MASS INDEX: 27.49 KG/M2

## 2017-07-19 DIAGNOSIS — H69.93 DYSFUNCTION OF EUSTACHIAN TUBE, BILATERAL: Primary | ICD-10-CM

## 2017-07-19 PROCEDURE — 99203 OFFICE O/P NEW LOW 30 MIN: CPT | Performed by: OTOLARYNGOLOGY

## 2017-07-19 RX ORDER — PSEUDOEPHEDRINE HCL 120 MG/1
120 TABLET, FILM COATED, EXTENDED RELEASE ORAL EVERY 12 HOURS PRN
Qty: 30 TABLET | Refills: 0 | Status: SHIPPED | OUTPATIENT
Start: 2017-07-19 | End: 2018-02-27

## 2017-07-19 ASSESSMENT — PAIN SCALES - GENERAL: PAINLEVEL: NO PAIN (0)

## 2017-07-19 NOTE — PROGRESS NOTES
Chief Complaint - ear symptoms    History of Present Illness - Tana Kyle is a 32 year old female who presents to me today with ear discomfort in both ear.  It has been present and noticeable for approximately 1.5 weeks. She works for [employer] and is around noise. Sometimes has itchiness. She has felt sick with a sore throat just today. She also had dizziness when this first started. She describes vertigo. That lasted 4-5 days. It went away. It only happened when lying down. There is a history of ear infections, but none in the last few years. The patient has tried meclizine and also flonase.    Past Medical History -   Patient Active Problem List   Diagnosis     CARDIOVASCULAR SCREENING; LDL GOAL LESS THAN 160       Current Medications -   Current Outpatient Prescriptions:      meclizine (ANTIVERT) 25 MG tablet, Take 1 tablet (25 mg) by mouth every 6 hours as needed for dizziness, Disp: 30 tablet, Rfl: 1     fluticasone (FLONASE) 50 MCG/ACT spray, Spray 1-2 sprays into both nostrils daily, Disp: 3 Bottle, Rfl: 11     multivitamin, therapeutic with minerals (THERA-VIT-M) TABS, Take 1 tablet by mouth daily, Disp: , Rfl:      Omega-3 Fatty Acids (OMEGA-3 FISH OIL PO), , Disp: , Rfl:      levonorgestrel (MIRENA, 52 MG,) 20 MCG/24HR IUD, 1 each (20 mcg) by Intrauterine route once for 1 dose, Disp: 1 each, Rfl: 0    Allergies - No Known Allergies    Social History -   Social History     Social History     Marital status: Single     Spouse name: N/A     Number of children: N/A     Years of education: N/A     Social History Main Topics     Smoking status: Never Smoker     Smokeless tobacco: None     Alcohol use No     Drug use: No     Sexual activity: Yes     Other Topics Concern     None     Social History Narrative       Family History -   Family History   Problem Relation Age of Onset     DIABETES Maternal Grandmother      Cancer - colorectal Maternal Grandmother      Alzheimer Disease Maternal Grandfather      Coronary  "Artery Disease No family hx of        Review of Systems - As per HPI and PMHx, otherwise 7 system review of the head and neck negative.    Physical Exam  Resp 16  Ht 1.651 m (5' 5\")  Wt 74.8 kg (165 lb)  LMP 06/28/2017 (Exact Date)  BMI 27.46 kg/m2  General - The patient is nontoxic, in no distress.  Alert and oriented to person and place, answers questions and cooperates with examination appropriately.   Voice and Breathing - The patient was breathing comfortably without the use of accessory muscles. There was no wheezing, stridor, or stertor.  The patients voice was clear and strong.  Ears - The tympanic membrane on the R is intact, no middle ear effusion. No acute infection.  No fluid or purulence was seen in the external canal. The tympanic membrane on the L is intact, no middle ear effusion. No acute infection.  No fluid or purulence was seen in the external canal.   Nose - Septum midline. Turbinates normal size. Airway patent bilaterally. No polyps, masses, or pus.   Eyes - Extraocular movements intact. Sclera were not icteric or injected.  Mouth - Examination of the oral cavity showed pink, healthy mucosa. Two aphthous ulcers lower lip.  The tongue was mobile and midline.  Throat - The walls of the oropharynx showed some erythema. The uvula was midline on elevation.    Neck - Palpation of the occipital, submental, submandibular, internal jugular chain, and supraclavicular nodes did not demonstrate any abnormal lymph nodes or masses. No parotid masses. Palpation of the thyroid was soft and smooth, with no nodules or goiter appreciated.  The trachea was mobile and midline.  Neurological - Cranial nerves 2 through 12 were grossly intact. House-Brackmann grade 1 out of 6 bilaterally.    Assessment and Plan - Tana Kyle is a 32 year old female who presents to me today with some ear discomfort. She cannot valsalva to open ears. This seems consistent with ETD. She had some vertigo with this, but this " subsided. This could also have been a viral ear infection. No fluid now. I recommend trying ear autoinsufflation and sudafed. Return if she develops hearing loss or tinnitus.     Stan Slaughter MD  Otolaryngology  [facility] [place]

## 2017-07-19 NOTE — PATIENT INSTRUCTIONS
General Scheduling Information  To schedule your CT/MRI scan, please contact [facility] at [phone]   [address] [place] [address]    To schedule your Surgery, please contact our Specialty Schedulers at [phone]    ENT Clinic Locations Clinic Hours Telephone Number     [facility]  [address] [place] [address]   Tuesday:       8:00am -- 4:00pm    Wednesday:  8:00am - 4:00pm   To schedule an appointment with   Dr. Slaughter,   please contact our   Specialty Scheduling Department at:     [phone]       [facility]  [address] [place] [address]   Friday:          8:00am - 4:00pm         Urgent Care Locations Clinic Hours Telephone Numbers     [facility]  [address] [place] [address]     Monday-Friday:     11:00pm - 9:00pm    Saturday-Sunday:  9:00am - 5:00pm   [phone]     [facility]  [address] [place] [address]     Monday-Friday:      5:00pm - 9:00pm     Saturday-Sunday:  9:00am - 5:00pm   [phone]

## 2017-07-19 NOTE — MR AVS SNAPSHOT
After Visit Summary   7/19/2017    Tana Kyle    MRN: [id number]           Patient Information     Date Of Birth          1985        Visit Information        Provider Department      7/19/2017 2:30 PM Stan Slaughter MD [facility] [place]        Today's Diagnoses     Dysfunction of eustachian tube, bilateral    -  1      Care Instructions    General Scheduling Information  To schedule your CT/MRI scan, please contact [facility] at [phone] [address] [place] [place] [address]    To schedule your Surgery, please contact our Specialty Schedulers at [phone]    ENT Clinic Locations Clinic Hours Telephone Number     [facility]  [address]  [place] [address]   Tuesday:       8:00am -- 4:00pm    Wednesday:  8:00am - 4:00pm   To schedule an appointment with   Dr. Slaughter,   please contact our   Specialty Scheduling Department at:     [phone]       [facility]  [address]   [place] [address]   Friday:          8:00am - 4:00pm         Urgent Care Locations Clinic Hours Telephone Numbers     [facility] [place]  [address] [place] [address]     Monday-Friday:     11:00pm - 9:00pm    Saturday-Sunday:  9:00am - 5:00pm   [phone]     [facility]  [address]   [place] [address]     Monday-Friday:      5:00pm - 9:00pm     Saturday-Sunday:  9:00am - 5:00pm   [phone]               Follow-ups after your visit        Who to contact     If you have questions or need follow up information about today's clinic visit or your schedule please contact [facility] directly at [phone].  Normal or non-critical lab and imaging results will be communicated to you by MyChart, letter or phone within 4 business days after the clinic has received the results. If you do not hear from us within 7 days, please contact the clinic through MyChart or phone. If you have a critical or abnormal lab result, we will  "notify you by phone as soon as possible.  Submit refill requests through [facility] [facility] or call your pharmacy and they will forward the refill request to us. Please allow 3 business days for your refill to be completed.          Additional Information About Your Visit        [employer] Information     [facility] gives you secure access to your electronic health record. If you see a primary care provider, you can also send messages to your care team and make appointments. If you have questions, please call your primary care clinic.  If you do not have a primary care provider, please call [phone] and they will assist you.        Care EveryWhere ID     This is your Care EveryWhere ID. This could be used by other organizations to access your [facility] medical records  [id number]        Your Vitals Were     Respirations Height Last Period BMI (Body Mass Index)          16 1.651 m (5' 5\") 06/28/2017 (Exact Date) 27.46 kg/m2         Blood Pressure from Last 3 Encounters:   07/14/17 136/83   07/12/17 121/76   06/23/17 129/73    Weight from Last 3 Encounters:   07/19/17 74.8 kg (165 lb)   07/14/17 74.8 kg (165 lb)   07/12/17 74.8 kg (165 lb)              Today, you had the following     No orders found for display         Today's Medication Changes          These changes are accurate as of: 7/19/17  4:24 PM.  If you have any questions, ask your nurse or doctor.               Start taking these medicines.        Dose/Directions    pseudoePHEDrine 120 MG 12 hr tablet   Commonly known as:  SUDAFED   Used for:  Dysfunction of eustachian tube, bilateral   Started by:  Stan Slaughter MD        Dose:  120 mg   Take 1 tablet (120 mg) by mouth every 12 hours as needed for congestion   Quantity:  30 tablet   Refills:  0            Where to get your medicines      Some of these will need a paper prescription and others can be bought over the counter.  Ask your nurse if you have questions.     Bring a paper prescription for each of these " medications     pseudoePHEDrine 120 MG 12 hr tablet                Primary Care Provider Office Phone # Fax #    Bell Juan Herron [phone]       [facility] [address] [facility] [address]        Equal Access to Services     ARMINTAMIE AUSTEN : Hadii lynette wallace hadjoano Soomaali, waaxda luqadaha, qaybta kaalmada adeegyada, waxjim espinoza haybriann adematias kristelsil rossi. So [facility] [phone].    ATENCIÓN: Si habla español, tiene a gómez disposición servicios gratuitos de asistencia lingüística. Llame al [phone].    We comply with applicable federal civil rights laws and [place] laws. We do not discriminate on the basis of race, color, national origin, age, disability sex, sexual orientation or gender identity.            Thank you!     Thank you for choosing [facility]  for your care. Our goal is always to provide you with excellent care. Hearing back from our patients is one way we can continue to improve our services. Please take a few minutes to complete the written survey that you may receive in the mail after your visit with us. Thank you!             Your Updated Medication List - Protect others around you: Learn how to safely use, store and throw away your medicines at www.disposemymeds.org.          This list is accurate as of: 7/19/17  4:24 PM.  Always use your most recent med list.                   Brand Name Dispense Instructions for use Diagnosis    fluticasone 50 MCG/ACT spray    FLONASE    3 Bottle    Spray 1-2 sprays into both nostrils daily    Bilateral chronic serous otitis media       levonorgestrel 20 MCG/24HR IUD    MIRENA (52 MG)    1 each    1 each (20 mcg) by Intrauterine route once for 1 dose    Encounter for IUD insertion       meclizine 25 MG tablet    ANTIVERT    30 tablet    Take 1 tablet (25 mg) by mouth every 6 hours as needed for dizziness    Bilateral chronic serous otitis media       multivitamin, therapeutic with minerals Tabs  tablet      Take 1 tablet by mouth daily        OMEGA-3 FISH OIL PO           pseudoePHEDrine 120 MG 12 hr tablet    SUDAFED    30 tablet    Take 1 tablet (120 mg) by mouth every 12 hours as needed for congestion    Dysfunction of eustachian tube, bilateral

## 2017-07-19 NOTE — NURSING NOTE
"Chief Complaint   Patient presents with     Ear Problem     Recurrent infections     Dizziness       Initial Resp 16  Ht 1.651 m (5' 5\")  Wt 74.8 kg (165 lb)  LMP 06/28/2017 (Exact Date)  BMI 27.46 kg/m2 Estimated body mass index is 27.46 kg/(m^2) as calculated from the following:    Height as of this encounter: 1.651 m (5' 5\").    Weight as of this encounter: 74.8 kg (165 lb).  Medication Reconciliation: complete     Renee Shukla MA    "

## 2018-01-29 ENCOUNTER — OFFICE VISIT (OUTPATIENT)
Dept: FAMILY MEDICINE | Facility: CLINIC | Age: 33
End: 2018-01-29
Payer: COMMERCIAL

## 2018-01-29 VITALS
SYSTOLIC BLOOD PRESSURE: 139 MMHG | OXYGEN SATURATION: 98 % | HEIGHT: 65 IN | WEIGHT: 183.6 LBS | HEART RATE: 75 BPM | TEMPERATURE: 98.7 F | DIASTOLIC BLOOD PRESSURE: 87 MMHG | BODY MASS INDEX: 30.59 KG/M2

## 2018-01-29 DIAGNOSIS — J01.90 ACUTE SINUSITIS WITH SYMPTOMS > 10 DAYS: Primary | ICD-10-CM

## 2018-01-29 DIAGNOSIS — J31.0 CHRONIC RHINITIS, UNSPECIFIED TYPE: ICD-10-CM

## 2018-01-29 PROCEDURE — 99213 OFFICE O/P EST LOW 20 MIN: CPT | Performed by: FAMILY MEDICINE

## 2018-01-29 RX ORDER — AMOXICILLIN 500 MG/1
500 CAPSULE ORAL 3 TIMES DAILY
Qty: 30 CAPSULE | Refills: 0 | Status: SHIPPED | OUTPATIENT
Start: 2018-01-29 | End: 2018-02-27

## 2018-01-29 NOTE — MR AVS SNAPSHOT
After Visit Summary   1/29/2018    Tana Kyle    MRN: [id number]           Patient Information     Date Of Birth          1985        Visit Information        Provider Department      1/29/2018 3:00 PM Brayan Duffy MD [facility]        Today's Diagnoses     Acute sinusitis with symptoms > 10 days    -  1    Chronic rhinitis, unspecified type          Care Instructions    mucinex as needed    Stay well hydrated    Take the antibiotics     Could use the fluticasone/ flonase     Follow up as needed based on symptoms           Follow-ups after your visit        Who to contact     If you have questions or need follow up information about today's clinic visit or your schedule please contact [facility] directly at [phone].  Normal or non-critical lab and imaging results will be communicated to you by MyChart, letter or phone within 4 business days after the clinic has received the results. If you do not hear from us within 7 days, please contact the clinic through [employer] or phone. If you have a critical or abnormal lab result, we will notify you by phone as soon as possible.  Submit refill requests through [employer] or call your pharmacy and they will forward the refill request to us. Please allow 3 business days for your refill to be completed.          Additional Information About Your Visit        MyChart Information     [employer] gives you secure access to your electronic health record. If you see a primary care provider, you can also send messages to your care team and make appointments. If you have questions, please call your primary care clinic.  If you do not have a primary care provider, please call [phone] and they will assist you.        Care EveryWhere ID     This is your Care EveryWhere ID. This could be used by other organizations to access your [facility] medical records  [id number]        Your Vitals Were     Pulse Temperature  "Height Last Period Pulse Oximetry Breastfeeding?    75 98.7  F (37.1  C) (Oral) 5' 4.65\" (1.642 m) 01/09/2018 98% No    BMI (Body Mass Index)                   30.89 kg/m2            Blood Pressure from Last 3 Encounters:   01/29/18 139/87   07/14/17 136/83   07/12/17 121/76    Weight from Last 3 Encounters:   01/29/18 183 lb 9.6 oz (83.3 kg)   07/19/17 165 lb (74.8 kg)   07/14/17 165 lb (74.8 kg)              Today, you had the following     No orders found for display         Today's Medication Changes          These changes are accurate as of 1/29/18  3:38 PM.  If you have any questions, ask your nurse or doctor.               Start taking these medicines.        Dose/Directions    amoxicillin 500 MG capsule   Commonly known as:  AMOXIL   Used for:  Acute sinusitis with symptoms > 10 days   Started by:  Brayan Duffy MD        Dose:  500 mg   Take 1 capsule (500 mg) by mouth 3 times daily   Quantity:  30 capsule   Refills:  0            Where to get your medicines      These medications were sent to [place] [address] [place] [facility] [address]  [address] [facility] [address]     Phone:  [phone]     amoxicillin 500 MG capsule                Primary Care Provider Office Phone # Fax #    Bell Juan Herron [phone]       [address]  [facility] [address]        Equal Access to Services     [facility] AH: Hadii aad ku hadasho Somargarita, waaxda luqadaha, qaybta kaalmada adeegyada, waxay olga french adematias rossi. So [facility] [phone].    ATENCIÓN: Si habla español, tiene a gómez disposición servicios gratuitos de asistencia lingüística. Llame al [phone].    We comply with applicable federal civil rights laws and [place] laws. We do not discriminate on the basis of race, color, national origin, age, disability, sex, sexual orientation, or gender identity.            Thank you!     Thank you for choosing [facility]  for your " care. Our goal is always to provide you with excellent care. Hearing back from our patients is one way we can continue to improve our services. Please take a few minutes to complete the written survey that you may receive in the mail after your visit with us. Thank you!             Your Updated Medication List - Protect others around you: Learn how to safely use, store and throw away your medicines at www.disposemymeds.org.          This list is accurate as of 1/29/18  3:38 PM.  Always use your most recent med list.                   Brand Name Dispense Instructions for use Diagnosis    amoxicillin 500 MG capsule    AMOXIL    30 capsule    Take 1 capsule (500 mg) by mouth 3 times daily    Acute sinusitis with symptoms > 10 days       fluticasone 50 MCG/ACT spray    FLONASE    3 Bottle    Spray 1-2 sprays into both nostrils daily    Bilateral chronic serous otitis media       levonorgestrel 20 MCG/24HR IUD    MIRENA (52 MG)    1 each    1 each (20 mcg) by Intrauterine route once for 1 dose    Encounter for IUD insertion       meclizine 25 MG tablet    ANTIVERT    30 tablet    Take 1 tablet (25 mg) by mouth every 6 hours as needed for dizziness    Bilateral chronic serous otitis media       multivitamin, therapeutic with minerals Tabs tablet      Take 1 tablet by mouth daily        OMEGA-3 FISH OIL PO           pseudoePHEDrine 120 MG 12 hr tablet    SUDAFED    30 tablet    Take 1 tablet (120 mg) by mouth every 12 hours as needed for congestion    Dysfunction of Eustachian tube, bilateral

## 2018-01-29 NOTE — PROGRESS NOTES
SUBJECTIVE:   Tana Kyle is a 32 year old female who presents to clinic today for the following health issues:      Acute Illness   Acute illness concerns: URI  Onset: 2-3 weeks    Fever: no    Chills/Sweats: no    Headache (location?): YES    Sinus Pressure:YES- post-nasal drainage    Conjunctivitis:  no    Ear Pain: YES: both    Rhinorrhea: YES    Congestion: YES    Sore Throat: YES     Cough: YES-productive of clear sputum, productive of yellow sputum    Wheeze: no     Decreased Appetite: no     Nausea: no    Vomiting: no    Diarrhea:  no    Dysuria/Freq.: no    Fatigue/Achiness: YES    Sick/Strep Exposure: YES     Therapies Tried and outcome: OTC Musinex AM/PM           Problem list and histories reviewed & adjusted, as indicated.  Additional history: as documented         Reviewed and updated as needed this visit by clinical staff       Reviewed and updated as needed this visit by Provider          just finished 3 days of bad throat/ headache symptoms     Able to go to work yest    Congested in chest, weak    Baggage handling    Very active job    mucinex helps some    No ongoing health problems     Was more in head, now both head and chest        Physical Exam   Constitutional: She is oriented to person, place, and time and well-developed, well-nourished, and in no distress.   HENT:   Head: Normocephalic and atraumatic.   Right Ear: Tympanic membrane, external ear and ear canal normal.   Left Ear: Tympanic membrane, external ear and ear canal normal.   Mouth/Throat: Oropharynx is clear and moist.   Nasal mucosa quite red/ swollen especially on left side.  Not real tender over sinuses/ submandib area.   Eyes: Conjunctivae are normal.   Neck: Neck supple.   Cardiovascular: Normal rate, regular rhythm and normal heart sounds.    Pulmonary/Chest: Effort normal and breath sounds normal. No respiratory distress. She exhibits no tenderness.   Abdominal: Soft. There is no tenderness.   No back or costovertebral  angle tenderness     Lymphadenopathy:     She has no cervical adenopathy.   Neurological: She is alert and oriented to person, place, and time.       ASSESSMENT / PLAN:  (J01.90) Acute sinusitis with symptoms > 10 days  (primary encounter diagnosis)  Comment: given duration of symptoms, reasonable to use antibiotic   Plan: amoxicillin (AMOXIL) 500 MG capsule        Stay well hydrated.  mucinex prn.    Follow up if symptoms not resolving     (J31.0) Chronic rhinitis, unspecified type  Comment: has the flonase at home  Plan: use prn, advised using for at least several days       I reviewed the patient's medications, allergies, medical history, family history, and social history.    Brayan Duffy MD

## 2018-01-29 NOTE — NURSING NOTE
"Chief Complaint   Patient presents with     URI     Health Maintenance     Influenza        Initial /87 (BP Location: Left arm, Patient Position: Chair, Cuff Size: Adult Regular)  Pulse 75  Temp 98.7  F (37.1  C) (Oral)  Ht 5' 4.65\" (1.642 m)  Wt 183 lb 9.6 oz (83.3 kg)  LMP 01/09/2018  SpO2 98%  Breastfeeding? No  BMI 30.89 kg/m2 Estimated body mass index is 30.89 kg/(m^2) as calculated from the following:    Height as of this encounter: 5' 4.65\" (1.642 m).    Weight as of this encounter: 183 lb 9.6 oz (83.3 kg).  Medication Reconciliation: cait Dennis MA      "

## 2018-01-29 NOTE — PATIENT INSTRUCTIONS
mucinex as needed    Stay well hydrated    Take the antibiotics     Could use the fluticasone/ flonase     Follow up as needed based on symptoms

## 2018-02-27 ENCOUNTER — OFFICE VISIT (OUTPATIENT)
Dept: FAMILY MEDICINE | Facility: CLINIC | Age: 33
End: 2018-02-27
Payer: COMMERCIAL

## 2018-02-27 VITALS
WEIGHT: 180 LBS | SYSTOLIC BLOOD PRESSURE: 127 MMHG | TEMPERATURE: 98.4 F | BODY MASS INDEX: 30.28 KG/M2 | HEART RATE: 92 BPM | OXYGEN SATURATION: 97 % | DIASTOLIC BLOOD PRESSURE: 85 MMHG

## 2018-02-27 DIAGNOSIS — J11.1 INFLUENZA-LIKE ILLNESS: Primary | ICD-10-CM

## 2018-02-27 PROCEDURE — 99213 OFFICE O/P EST LOW 20 MIN: CPT | Performed by: PHYSICIAN ASSISTANT

## 2018-02-27 RX ORDER — PREDNISONE 20 MG/1
20 TABLET ORAL 2 TIMES DAILY
Qty: 10 TABLET | Refills: 0 | Status: SHIPPED | OUTPATIENT
Start: 2018-02-27 | End: 2018-03-05

## 2018-02-27 NOTE — NURSING NOTE
"Chief Complaint   Patient presents with     RECHECK     cold symptoms       Initial /85 (BP Location: Left arm, Patient Position: Chair, Cuff Size: Adult Regular)  Pulse 92  Temp 98.4  F (36.9  C) (Oral)  Wt 180 lb (81.6 kg)  SpO2 97%  BMI 30.28 kg/m2 Estimated body mass index is 30.28 kg/(m^2) as calculated from the following:    Height as of 1/29/18: 5' 4.65\" (1.642 m).    Weight as of this encounter: 180 lb (81.6 kg).  Medication Reconciliation: complete   Shima See [facility] [place]      "

## 2018-02-27 NOTE — MR AVS SNAPSHOT
After Visit Summary   2/27/2018    Tana Kyle    MRN: [id number]           Patient Information     Date Of Birth          1985        Visit Information        Provider Department      2/27/2018 1:00 PM Martina Cohen PA-C [facility]        Today's Diagnoses     Influenza-like illness    -  1      Care Instructions    Try flonase 2 sprays per nostril daily.     Push fluids.     Call if fever or symptoms worsen.           Follow-ups after your visit        Who to contact     If you have questions or need follow up information about today's clinic visit or your schedule please contact [facility] directly at [phone].  Normal or non-critical lab and imaging results will be communicated to you by [employer], letter or phone within 4 business days after the clinic has received the results. If you do not hear from us within 7 days, please contact the clinic through [employer] or phone. If you have a critical or abnormal lab result, we will notify you by phone as soon as possible.  Submit refill requests through [employer] or call your pharmacy and they will forward the refill request to us. Please allow 3 business days for your refill to be completed.          Additional Information About Your Visit        MyChart Information     [employer] gives you secure access to your electronic health record. If you see a primary care provider, you can also send messages to your care team and make appointments. If you have questions, please call your primary care clinic.  If you do not have a primary care provider, please call [phone] and they will assist you.        Care EveryWhere ID     This is your Care EveryWhere ID. This could be used by other organizations to access your [place] medical records  [id number]        Your Vitals Were     Pulse Temperature Pulse Oximetry BMI (Body Mass Index)          92 98.4  F (36.9  C) (Oral) 97% 30.28 kg/m2         Blood  Pressure from Last 3 Encounters:   02/27/18 127/85   01/29/18 139/87   07/14/17 136/83    Weight from Last 3 Encounters:   02/27/18 180 lb (81.6 kg)   01/29/18 183 lb 9.6 oz (83.3 kg)   07/19/17 165 lb (74.8 kg)              Today, you had the following     No orders found for display         Today's Medication Changes          These changes are accurate as of 2/27/18  1:18 PM.  If you have any questions, ask your nurse or doctor.               Start taking these medicines.        Dose/Directions    predniSONE 20 MG tablet   Commonly known as:  DELTASONE   Used for:  Influenza-like illness   Started by:  Martina Cohen PA-C        Dose:  20 mg   Take 1 tablet (20 mg) by mouth 2 times daily   Quantity:  10 tablet   Refills:  0            Where to get your medicines      These medications were sent to [place] [address] [place] [facility] [address]  [address] [facility] [address]     Phone:  [phone]     predniSONE 20 MG tablet                Primary Care Provider Office Phone # Fax #    Blel Juan Herron [phone]       [address]  [facility] [address]        Equal Access to Services     ENDY BOYCE : Hadii lynette ku hadasho Soomaali, waaxda luqadaha, qaybta kaalmada adeegyada, waxay olga rossi. So [facility] [phone].    ATENCIÓN: Si habla español, tiene a gómez disposición servicios gratuitos de asistencia lingüística. Llame al [phone].    We comply with applicable federal civil rights laws and [place] laws. We do not discriminate on the basis of race, color, national origin, age, disability, sex, sexual orientation, or gender identity.            Thank you!     Thank you for choosing [facility]  for your care. Our goal is always to provide you with excellent care. Hearing back from our patients is one way we can continue to improve our services. Please take a few minutes to complete the written survey that you may  receive in the mail after your visit with us. Thank you!             Your Updated Medication List - Protect others around you: Learn how to safely use, store and throw away your medicines at www.disposemymeds.org.          This list is accurate as of 2/27/18  1:18 PM.  Always use your most recent med list.                   Brand Name Dispense Instructions for use Diagnosis    fluticasone 50 MCG/ACT spray    FLONASE    3 Bottle    Spray 1-2 sprays into both nostrils daily    Bilateral chronic serous otitis media       levonorgestrel 20 MCG/24HR IUD    MIRENA (52 MG)    1 each    1 each (20 mcg) by Intrauterine route once for 1 dose    Encounter for IUD insertion       multivitamin, therapeutic with minerals Tabs tablet      Take 1 tablet by mouth daily        OMEGA-3 FISH OIL PO           predniSONE 20 MG tablet    DELTASONE    10 tablet    Take 1 tablet (20 mg) by mouth 2 times daily    Influenza-like illness

## 2018-02-27 NOTE — PROGRESS NOTES
SUBJECTIVE:   Tana Kyle is a 33 year old female who presents to clinic today for the following health issues:      Acute Illness   Acute illness concerns: URI  Onset: a week ago    Fever: no, but had it    Chills/Sweats: no but had it    Headache (location?): YES    Sinus Pressure:YES    Conjunctivitis:  YES- been red and dripped for both eyes    Ear Pain: YES: both    Rhinorrhea: YES    Congestion: YES    Sore Throat: YES     Cough: YES-productive of yellow sputum    Wheeze: YES    Decreased Appetite: YES    Nausea: YES    Vomiting: no    Diarrhea:  YES- during the beginning    Dysuria/Freq.: no    Fatigue/Achiness: YES    Sick/Strep Exposure: YES- friend's twin children     Therapies Tried and outcome: Nasal spray which does not seem to help    Pt was seen about a month ago for the same symptoms. Given antibiotics but symptoms has returned again.  Pt traveled last week. Stayed with her friend who has twins that had the flu.    Finished all the amoxicillin and did feel better. Had painful headaches in her eyes. Went on a trip [place]. Noticed that her ears were painful. Exposed to the flu.   Fever and nausea has improved. Her ears have been painful, throat painful and cough.   Feels weak and with headaches still.   Tried flonase yesterday for the first time. Ibuprofen, 600mg. Doesn't seem to help much.   Has been eating and drinking ok. Had a decreased appetite through all this.     Problem list and histories reviewed & adjusted, as indicated.  Additional history: as documented    Patient Active Problem List   Diagnosis     CARDIOVASCULAR SCREENING; LDL GOAL LESS THAN 160     History reviewed. No pertinent surgical history.    Social History   Substance Use Topics     Smoking status: Never Smoker     Smokeless tobacco: Never Used     Alcohol use No     Family History   Problem Relation Age of Onset     DIABETES Maternal Grandmother      Cancer - colorectal Maternal Grandmother      Alzheimer Disease Maternal  Grandfather      Coronary Artery Disease No family hx of            Reviewed and updated as needed this visit by clinical staff  Tobacco  Allergies  Meds  Problems  Med Hx  Surg Hx  Fam Hx  Soc Hx        Reviewed and updated as needed this visit by Provider  Allergies  Meds  Problems         ROS:   Constitutional, HEENT, cardiovascular, pulmonary, gi and gu systems are negative, except as otherwise noted.    OBJECTIVE:     /85 (BP Location: Left arm, Patient Position: Chair, Cuff Size: Adult Regular)  Pulse 92  Temp 98.4  F (36.9  C) (Oral)  Wt 180 lb (81.6 kg)  SpO2 97%  BMI 30.28 kg/m2  Body mass index is 30.28 kg/(m^2).  GENERAL: healthy, alert and no distress  HENT: ear canals and TM's with fluid bilaterally, nose and mouth without ulcers or lesions  NECK: minimal cervical adenopathy  RESP: lungs clear to auscultation - no rales, rhonchi or wheezes  CV: regular rate and rhythm, normal S1 S2, no S3 or S4, no murmur, click or rub,     Diagnostic Test Results:  none     ASSESSMENT/PLAN:       ICD-10-CM    1. Influenza-like illness R69 predniSONE (DELTASONE) 20 MG tablet   Try prednisone and flonase. Rest and fluids. return to clinic if not improving as expected.     FUTURE APPOINTMENTS:       - Follow-up for annual visit or as needed    Martina Cohen PA-C  [facility]

## 2018-03-05 ENCOUNTER — OFFICE VISIT (OUTPATIENT)
Dept: FAMILY MEDICINE | Facility: CLINIC | Age: 33
End: 2018-03-05
Payer: COMMERCIAL

## 2018-03-05 ENCOUNTER — TELEPHONE (OUTPATIENT)
Dept: FAMILY MEDICINE | Facility: CLINIC | Age: 33
End: 2018-03-05

## 2018-03-05 VITALS
OXYGEN SATURATION: 97 % | BODY MASS INDEX: 30.32 KG/M2 | HEART RATE: 87 BPM | HEIGHT: 65 IN | DIASTOLIC BLOOD PRESSURE: 79 MMHG | WEIGHT: 182 LBS | SYSTOLIC BLOOD PRESSURE: 119 MMHG | TEMPERATURE: 98.4 F

## 2018-03-05 DIAGNOSIS — J06.9 VIRAL URI WITH COUGH: Primary | ICD-10-CM

## 2018-03-05 PROCEDURE — 99213 OFFICE O/P EST LOW 20 MIN: CPT | Performed by: PHYSICIAN ASSISTANT

## 2018-03-05 RX ORDER — AMOXICILLIN 875 MG
875 TABLET ORAL 2 TIMES DAILY
Qty: 20 TABLET | Refills: 0 | Status: SHIPPED | OUTPATIENT
Start: 2018-03-05 | End: 2020-01-03

## 2018-03-05 NOTE — TELEPHONE ENCOUNTER
Attempt # 1  Called patient at home number.  Was call answered?  Yes, patient already at clinic waiting to be checked in.      Shandra Cadena RN  [facility] [place] [facility]

## 2018-03-05 NOTE — MR AVS SNAPSHOT
"              After Visit Summary   3/5/2018    Tana Kyle    MRN: [id number]           Patient Information     Date Of Birth          1985        Visit Information        Provider Department      3/5/2018 10:00 AM Martina Cohen PA-C [facility]        Today's Diagnoses     Viral URI with cough    -  1       Follow-ups after your visit        Who to contact     If you have questions or need follow up information about today's clinic visit or your schedule please contact [facility] directly at [phone].  Normal or non-critical lab and imaging results will be communicated to you by [employer], letter or phone within 4 business days after the clinic has received the results. If you do not hear from us within 7 days, please contact the clinic through [email or web address] or phone. If you have a critical or abnormal lab result, we will notify you by phone as soon as possible.  Submit refill requests through [employer] or call your pharmacy and they will forward the refill request to us. Please allow 3 business days for your refill to be completed.          Additional Information About Your Visit        MyChart Information     [employer] gives you secure access to your electronic health record. If you see a primary care provider, you can also send messages to your care team and make appointments. If you have questions, please call your primary care clinic.  If you do not have a primary care provider, please call [phone] and they will assist you.        Care EveryWhere ID     This is your Care EveryWhere ID. This could be used by other organizations to access your [place] medical records  [id number]        Your Vitals Were     Pulse Temperature Height Pulse Oximetry BMI (Body Mass Index)       87 98.4  F (36.9  C) (Oral) 5' 4.84\" (1.647 m) 97% 30.43 kg/m2        Blood Pressure from Last 3 Encounters:   03/05/18 119/79   02/27/18 127/85   01/29/18 139/87    Weight from Last 3 " Encounters:   03/05/18 182 lb (82.6 kg)   02/27/18 180 lb (81.6 kg)   01/29/18 183 lb 9.6 oz (83.3 kg)              Today, you had the following     No orders found for display         Today's Medication Changes          These changes are accurate as of 3/5/18 10:21 AM.  If you have any questions, ask your nurse or doctor.               Start taking these medicines.        Dose/Directions    amoxicillin 875 MG tablet   Commonly known as:  AMOXIL   Used for:  Viral URI with cough   Started by:  Martina Cohen PA-C        Dose:  875 mg   Take 1 tablet (875 mg) by mouth 2 times daily   Quantity:  20 tablet   Refills:  0            Where to get your medicines      These medications were sent to [place] [address] [place] [facility] - [place], [place] - [address]  [address] [place] [address]     Phone:  [phone]     amoxicillin 875 MG tablet                Primary Care Provider Office Phone # Fax #    Bell Juan Herron [phone]       [address] [facility] [address]        Equal Access to Services     [facility]: Hadii lynette ku hadasho Soomaali, waaxda luqadaha, qaybta kaalmada adeegyada, wilfrid espinoza haychelo duncan . So [facility] [phone].    ATENCIÓN: Si habla español, tiene a gómez disposición servicios gratuitos de asistencia lingüística. Llame al [phone].    We comply with applicable federal civil rights laws and [place] laws. We do not discriminate on the basis of race, color, national origin, age, disability, sex, sexual orientation, or gender identity.            Thank you!     Thank you for choosing [facility]  for your care. Our goal is always to provide you with excellent care. Hearing back from our patients is one way we can continue to improve our services. Please take a few minutes to complete the written survey that you may receive in the mail after your visit with us. Thank you!             Your Updated Medication List - Protect  others around you: Learn how to safely use, store and throw away your medicines at www.disposemymeds.org.          This list is accurate as of 3/5/18 10:21 AM.  Always use your most recent med list.                   Brand Name Dispense Instructions for use Diagnosis    amoxicillin 875 MG tablet    AMOXIL    20 tablet    Take 1 tablet (875 mg) by mouth 2 times daily    Viral URI with cough       fluticasone 50 MCG/ACT spray    FLONASE    3 Bottle    Spray 1-2 sprays into both nostrils daily    Bilateral chronic serous otitis media       levonorgestrel 20 MCG/24HR IUD    MIRENA (52 MG)    1 each    1 each (20 mcg) by Intrauterine route once for 1 dose    Encounter for IUD insertion       multivitamin, therapeutic with minerals Tabs tablet      Take 1 tablet by mouth daily        OMEGA-3 FISH OIL PO

## 2018-03-05 NOTE — PROGRESS NOTES
SUBJECTIVE:   Tana Kyle is a 33 year old female who presents to clinic today for the following health issues:      ENT Symptoms             Symptoms: cc Present Absent Comment   Fever/Chills   x    Fatigue  x     Muscle Aches  x     Eye Irritation  x  Both eyes   Sneezing   x    Nasal Porfirio/Drg  x     Sinus Pressure/Pain   x    Loss of smell   x    Dental pain   x    Sore Throat   x    Swollen Glands   x    Ear Pain/Fullness  x  Both ears, feels like fluid in the ears   Cough  x     Wheeze  x     Chest Pain   x    Shortness of breath   x    Rash   x    Other         Symptom duration:  2 wks   Symptom severity:  mild, moderate, and severe   Treatments tried:  none   Contacts:  Yes       Pt needs work note.     Tried to go to work yesterday but had to leave early. Still has throat pain, with coughing. She has some wheezing. She has some phelgm.   Has had some bloody drainage from the nose.     Still with fluid in the ears. Using the Flonase. Prednisone seemed to help loosen the mucus but not much else. Still very fatigued.       Problem list and histories reviewed & adjusted, as indicated.  Additional history: as documented    Patient Active Problem List   Diagnosis     CARDIOVASCULAR SCREENING; LDL GOAL LESS THAN 160     History reviewed. No pertinent surgical history.    Social History   Substance Use Topics     Smoking status: Never Smoker     Smokeless tobacco: Never Used     Alcohol use No     Family History   Problem Relation Age of Onset     DIABETES Maternal Grandmother      Cancer - colorectal Maternal Grandmother      Alzheimer Disease Maternal Grandfather      Coronary Artery Disease No family hx of            Reviewed and updated as needed this visit by clinical staff  Tobacco  Allergies  Meds  Problems  Med Hx  Surg Hx  Fam Hx  Soc Hx        Reviewed and updated as needed this visit by Provider  Allergies  Meds  Problems         ROS:  Constitutional, HEENT, cardiovascular, pulmonary, gi and  "gu systems are negative, except as otherwise noted.    OBJECTIVE:     /79 (BP Location: Right arm, Patient Position: Chair, Cuff Size: Adult Regular)  Pulse 87  Temp 98.4  F (36.9  C) (Oral)  Ht 5' 4.84\" (1.647 m)  Wt 182 lb (82.6 kg)  SpO2 97%  BMI 30.43 kg/m2  Body mass index is 30.43 kg/(m^2).  GENERAL: healthy, alert and no distress  HENT: ear canals and TM's normal, nose with purulent discharge and mouth without ulcers or lesions- No sinus tenderness   NECK: no adenopathy,  RESP: lungs clear to auscultation - no rales, rhonchi or wheezes  CV: regular rate and rhythm, normal S1 S2, no S3 or S4, no murmur, click or rub,     Diagnostic Test Results:  none     ASSESSMENT/PLAN:       ICD-10-CM    1. Viral URI with cough J06.9 amoxicillin (AMOXIL) 875 MG tablet    B97.89    Patient with likely flu, but persistent symptoms. Will treat with amoxicillin- continue over the counter meds. Discussed that illness could take up to several weeks to fully resolve. Patient to mychart if symptoms worsen or change.     FUTURE APPOINTMENTS:       - Follow-up for annual visit or as needed    Martina Cohen PA-C  [facility]  "

## 2018-03-05 NOTE — LETTER
March 5, 2018        Tana Kyle  [address] [facility] [address]          To whom it may concern:    RE: Tana Kyle    Patient was seen and treated today at our clinic. Please excuse her from work 3/4/18-3/6/18 due to illness.     Please contact me for questions or concerns.      Sincerely,        Martina Cohen PA-C

## 2018-03-05 NOTE — NURSING NOTE
"Chief Complaint   Patient presents with     URI     Ear Problem       Initial /79 (BP Location: Right arm, Patient Position: Chair, Cuff Size: Adult Regular)  Pulse 87  Temp 98.4  F (36.9  C) (Oral)  Ht 5' 4.84\" (1.647 m)  Wt 182 lb (82.6 kg)  SpO2 97%  BMI 30.43 kg/m2 Estimated body mass index is 30.43 kg/(m^2) as calculated from the following:    Height as of this encounter: 5' 4.84\" (1.647 m).    Weight as of this encounter: 182 lb (82.6 kg).  Medication Reconciliation: complete     CARLOS Dennis MA      "

## 2018-03-05 NOTE — TELEPHONE ENCOUNTER
Reason for call:  Patient reporting a symptom    Symptom or request: Ongoing cough / Ear fluid    Duration (how long have symptoms been present): Ongoing for 2 weeks    Have you been treated for this before? Yes    Additional comments: patient stated she she saw Martina Cohen on 2/27, she is still not getting better. She completed the prednisone prescription but still is having cough attacks and it is having trouble getting phlegm out. She also stated her ear has fluid in it. She scheduled a 10 am with Martina Cohen today but she is wondering if she should keep this or if this could be treated over the phone. Please call patient before 10 am to inform.    Phone Number patient can be reached at:  Home number on file [phone] (home)    Best Time:  Before 10 am    Can we leave a detailed message on this number:  YES    Call taken on 3/5/2018 at 7:23 AM by Edith Ortez

## 2020-01-03 ENCOUNTER — OFFICE VISIT (OUTPATIENT)
Dept: FAMILY MEDICINE | Facility: CLINIC | Age: 35
End: 2020-01-03
Payer: COMMERCIAL

## 2020-01-03 VITALS
TEMPERATURE: 97.9 F | HEART RATE: 79 BPM | HEIGHT: 65 IN | DIASTOLIC BLOOD PRESSURE: 82 MMHG | SYSTOLIC BLOOD PRESSURE: 125 MMHG | WEIGHT: 203.8 LBS | BODY MASS INDEX: 33.95 KG/M2

## 2020-01-03 DIAGNOSIS — M25.531 PAIN IN BOTH WRISTS: Primary | ICD-10-CM

## 2020-01-03 DIAGNOSIS — M79.641 BILATERAL HAND PAIN: ICD-10-CM

## 2020-01-03 DIAGNOSIS — M79.642 BILATERAL HAND PAIN: ICD-10-CM

## 2020-01-03 DIAGNOSIS — M25.532 PAIN IN BOTH WRISTS: Primary | ICD-10-CM

## 2020-01-03 PROCEDURE — 99213 OFFICE O/P EST LOW 20 MIN: CPT | Performed by: PHYSICIAN ASSISTANT

## 2020-01-03 ASSESSMENT — MIFFLIN-ST. JEOR: SCORE: 1615.31

## 2020-01-03 NOTE — PROGRESS NOTES
"Subjective     Tana Kyle is a 34 year old female who presents to clinic today for the following health issues:    HPI   Pt is looking for referral for physical therapy. Pt stated for her wrists and hands she would like physical therapy for.      Patient notes she used to be a  for [employer] for 6 years. She notes at that time she had seen occupational health and was diangosed with tendonitis.     She notes both hands bother her. Right is worse, left has improved some. 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     Hard for her to open things. When she sleeps at night, if her hand is bent at all it hurts.   Hard for her to  things. Swimming and stationary bike is hard.   Some numbness/tingling at times.   Right hand dominant.   Has not used medications.   \"the pain is everywhere in the hand\" Patient is not able to locate an exact area of pain.   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           Patient Active Problem List   Diagnosis     CARDIOVASCULAR SCREENING; LDL GOAL LESS THAN 160     History reviewed. No pertinent surgical history.    Social History     Tobacco Use     Smoking status: Never Smoker     Smokeless tobacco: Never Used   Substance Use Topics     Alcohol use: No     Family History   Problem Relation Age of Onset     Diabetes Maternal Grandmother      Cancer - colorectal Maternal Grandmother      Alzheimer Disease Maternal Grandfather      Coronary Artery Disease No family hx of            Reviewed and updated as needed this visit by Provider  Tobacco  Allergies  Meds  Problems  Med Hx  Surg Hx  Fam Hx         Review of Systems   ROS COMP: Constitutional, HEENT, cardiovascular, pulmonary, gi and gu systems are negative, except as otherwise noted.      Objective    /82 (BP Location: Left arm, Patient Position: Chair, Cuff Size: Adult Large)   Pulse 79   Temp 97.9  F (36.6  C) (Oral)   Ht 1.651 m (5' " "5\")   Wt 92.4 kg (203 lb 12.8 oz)   Breastfeeding No   BMI 33.91 kg/m    Body mass index is 33.91 kg/m .  Physical Exam   GENERAL: healthy, alert and no distress  MS: no gross musculoskeletal defects noted, no edema- full range of motion of the bilateral wrists, negative Tinel's and Phalen sign. Normal  strength. Normal finger strength.   SKIN: no suspicious lesions or rashes  NEURO: Normal strength and tone, mentation intact and speech normal    Diagnostic Test Results:  none         Assessment & Plan       ICD-10-CM    1. Pain in both wrists M25.531 [facility]    M25.532    2. Bilateral hand pain M79.641 [facility]    M79.642    Referral to physical therapy. Follow up after if not improving.      No follow-ups on file.    Martina Cohen PA-C  [facility]      "

## 2020-01-14 ENCOUNTER — THERAPY VISIT (OUTPATIENT)
Dept: OCCUPATIONAL THERAPY | Facility: CLINIC | Age: 35
End: 2020-01-14
Payer: COMMERCIAL

## 2020-01-14 DIAGNOSIS — M79.642 BILATERAL HAND PAIN: ICD-10-CM

## 2020-01-14 DIAGNOSIS — M25.532 PAIN IN BOTH WRISTS: ICD-10-CM

## 2020-01-14 DIAGNOSIS — M25.531 PAIN IN BOTH WRISTS: ICD-10-CM

## 2020-01-14 DIAGNOSIS — M79.641 BILATERAL HAND PAIN: ICD-10-CM

## 2020-01-14 PROCEDURE — 97110 THERAPEUTIC EXERCISES: CPT | Mod: GO | Performed by: OCCUPATIONAL THERAPIST

## 2020-01-14 PROCEDURE — 97112 NEUROMUSCULAR REEDUCATION: CPT | Mod: GO | Performed by: OCCUPATIONAL THERAPIST

## 2020-01-14 PROCEDURE — 97165 OT EVAL LOW COMPLEX 30 MIN: CPT | Mod: GO | Performed by: OCCUPATIONAL THERAPIST

## 2020-01-14 NOTE — PROGRESS NOTES
[facility] Initial Evaluation     Current Date: 1/14/2020    Diagnosis: Bilateral hand and wrist pain (right > left)  DOI: 1/3/2020 (therapy referral)    Subjective:  Tana Kyle being seen for bilateral hand and wrist pain.   Problem began 8/30/2015. Where condition occurred: at work.Problem occurred:  for [employer] .  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  General health as reported by patient is good. Pertinent medical history includes:  Overweight and pain at night/rest.         Primary job tasks include:  Computer work, prolonged sitting, prolonged standing and repetitive tasks.           Patient is Health Unit Coordinator , [employer] practitioner. Restrictions include:  Working in normal job without restrictions.    Barriers include:  Other.  Red flags:  None as reported by patient.    Occupational Profile Information:  Right hand dominant  Prior functional level:  no limitations  Patient reports symptoms of pain and weakness/loss of strength  Barriers include:none  Mobility: No difficulty  Transportation: drives    Functional Outcome Measure:  Upper Extremity Functional Index  SCORE:   Column Totals: 43/80  (A lower score indicates greater disability.)    Objective:  Pain Level (Scale 0-10)   1/14/2020   At Rest 0 right  0 left   With Use 9 right  6 left     Pain Description  Date 1/14/2020   Location elbow, wrist, hand and thumb   Pain Quality Aching and Throbbing   Frequency intermittent     Pain is worst  end of day   Exacerbated by  repetitive use   Relieved by none   Progression Improved from onset      Posture  Forward Neck Posture and Rounded Forward Shoulders    Edema  None    Sensation  WNL throughout all nerve distributions; per patient report    ROM   WNL elbows, wrists, hands and thumbs. No thenar or intrinsic atrophy noted on exam.    Special Tests   - none    + mild    ++ moderate     +++ severe       1/14/2020   Thumb CMC grind R:-  L:-   Thumb CMC passive retroposition  R:-  L:-   Finkelstein  R:-  L:-   Radial nerve R:+ sh abd  L:++ wrist flex   Median nerve R:- stretch end range  L:+ wrist ext     Resisted Testing  Pain Report:  - none    + mild    ++ moderate    +++ severe    1/14/2020   Elbow Extension R:-  L:-   Elbow Flexion R:-  L:-   Supination  R:-  L:-   Pronation R:-  L:-   Wrist Ext with RD, Elbow Ext R:-  L:-   Wrist Ext with UD, Elbow Ext R:-  L:-   Wrist Flex with RD, Elbow Ext R:-  L:-   Wrist Flex with UD, Elbow Ext R:-  L:-   EDC with Elbow Ext R:-  L:-   Long Finger Test R:-  L:-   FDS R:-  L:-   EPB R:-  L:-   APL R:-  L:-     Strength   (Measured in pounds)  Pain Report: - none  + mild    ++ moderate    +++ severe    1/14/2020 1/14/2020   Trials Right Left   1  2  3 26-  27-  25- 33-  24-  27-   Average 26 28     Lat Pinch 1/14/2020 1/14/2020   Trials Right Left   1  2  3 9-  8-  9- 8-  11-  11-   Average 9 10     3 Pt Pinch 1/14/2020 1/14/2020   Trials Right Left   1  2  3 11-  10-  9- 10-  11-  10-   Average 10 10     Palpation  Pain Report:  - none    + mild    ++ moderate    +++ severe    1/14/2020   Pecs R:-  L:-   Bicep R:-  L:-   Tricep R:-  L:-   LEP R:+  L:+ slight   Extensors R:+  L:-   MEP R:-  L:-   Flexors R:-  L:-   Radial wrist/1st dc R:+  L:-   Ulnar wrist R:-  L:-   Volar wrist R:-  L:-   Dorsal wrist R:-  L:-     Assessment:  Patient presents with symptoms consistent with diagnosis of bilateral wrist and hand pain and weakness, with conservative intervention.     Patient's limitations or Problem List includes:  Pain, Weakness, Decreased , Decreased pinch and Tightness in musculature of bilateral elbows, wrists and hands which interferes with the patient's ability to perform Self Care Tasks (eating, bathing), Work Tasks, Sleep Patterns, Recreational Activities, Household Chores and Driving  as compared to previous level of function.    Rehab  Potential:  Excellent - Return to full activity, no limitations    Patient will benefit from skilled Occupational Therapy to increase flexibility, overall strength,  strength and pinch strength and decrease pain to return to previous activity level and resume normal daily tasks and to reach their rehab potential.    Barriers to Learning:  No barrier    Communication Issues:  Patient appears to be able to clearly communicate and understand verbal and written communication and follow directions correctly.    Chart Review: Chart Review and Simple history review with patient    Identified Performance Deficits: bathing/showering, communication management, driving and community mobility, home establishment and management, meal preparation and cleanup, sleep, work and leisure activities    Assessment of Occupational Performance:  5 or more Performance Deficits    Clinical Decision Making (Complexity): Low complexity    Treatment Explanation:  The following has been discussed with the patient:  RX ordered/plan of care  Anticipated outcomes  Possible risks and side effects    Plan:  Frequency:  1 X week, once daily  Duration:  for 6 weeks    Treatment Plan:    Modalities:    US and Paraffin   Therapeutic Exercise:    AROM, PROM, Tendon Gliding, Isotonics and Isometrics  Neuromuscular re-ed:   Nerve Gliding, Posture and Kinesiotaping  Manual Techniques:   Friction massage and Myofascial release  Orthotic Fabrication:    Forearm based orthoses  Self Care:    Self Care Tasks and Ergonomic Considerations    Discharge Plan:  Achieve all LTG.  Independent in home treatment program.  Reach maximal therapeutic benefit.    Home Exercise Program:  Warmth for stiffness  Ice after activity for pain  MFR to extensors with tennis ball  Forearm E/F stretches  Partial proximal median nerve gliding for myofascial flexor stretch  Partial proximal radial nerve gliding for myofascial extensor stretch  Postural awareness, avoid rounded  shoulders and forward head  Consider OTC wrist splints sleeping    Next Visit:  See in 1-2 weeks  Assess response to HEP  Progress to pec stretches, postural exercises and strengthening as hiram

## 2020-01-23 ENCOUNTER — THERAPY VISIT (OUTPATIENT)
Dept: OCCUPATIONAL THERAPY | Facility: CLINIC | Age: 35
End: 2020-01-23
Payer: COMMERCIAL

## 2020-01-23 DIAGNOSIS — M79.641 BILATERAL HAND PAIN: ICD-10-CM

## 2020-01-23 DIAGNOSIS — M25.531 PAIN IN BOTH WRISTS: ICD-10-CM

## 2020-01-23 DIAGNOSIS — M79.642 BILATERAL HAND PAIN: ICD-10-CM

## 2020-01-23 DIAGNOSIS — M25.532 PAIN IN BOTH WRISTS: ICD-10-CM

## 2020-01-23 PROCEDURE — 97112 NEUROMUSCULAR REEDUCATION: CPT | Mod: GO | Performed by: OCCUPATIONAL THERAPIST

## 2020-01-23 PROCEDURE — 97110 THERAPEUTIC EXERCISES: CPT | Mod: GO | Performed by: OCCUPATIONAL THERAPIST

## 2020-01-23 NOTE — PROGRESS NOTES
SOAP note objective information for 1/23/2020:    Pain Level (Scale 0-10)   1/14/2020 1/23/20   At Rest 0 right  0 left    With Use 9 right  6 left 5-6 right  5-6 left     Palpation  Pain Report:  - none    + mild    ++ moderate    +++ severe    1/14/2020 1/23/20   LEP R:+  L:+ slight R:-  L:-   Extensors R:+  L:- R:+ tightness  L:+ tightness   Radial wrist/1st dc R:+  L:- R:-  L:-     Home Exercise Program:  Warmth for stiffness  Ice after activity for pain  MFR to extensors with tennis ball  Forearm E/F stretches  Proximal median nerve gliding for myofascial flexor stretch  Proximal radial nerve gliding for myofascial extensor stretch   and pinch strengthening  Pec stretch in doorway  Postural exercises with chin tucks and scapular retraction  Postural awareness, avoid rounded shoulders and forward head  Consider OTC wrist splints sleeping    Next Visit:  See in 1-2 weeks  Assess response to pec stretches, postural exercises and /pinch strengthening  Add wrist isometrics and stabilization     Please refer to the daily flowsheet for treatment today, total treatment time and time spent performing 1:1 timed codes.

## 2020-01-29 ENCOUNTER — THERAPY VISIT (OUTPATIENT)
Dept: OCCUPATIONAL THERAPY | Facility: CLINIC | Age: 35
End: 2020-01-29
Payer: COMMERCIAL

## 2020-01-29 DIAGNOSIS — M25.532 PAIN IN BOTH WRISTS: ICD-10-CM

## 2020-01-29 DIAGNOSIS — M79.642 BILATERAL HAND PAIN: ICD-10-CM

## 2020-01-29 DIAGNOSIS — M25.531 PAIN IN BOTH WRISTS: ICD-10-CM

## 2020-01-29 DIAGNOSIS — M79.641 BILATERAL HAND PAIN: ICD-10-CM

## 2020-01-29 PROCEDURE — 97112 NEUROMUSCULAR REEDUCATION: CPT | Mod: GO | Performed by: OCCUPATIONAL THERAPIST

## 2020-01-29 PROCEDURE — 97140 MANUAL THERAPY 1/> REGIONS: CPT | Mod: GO | Performed by: OCCUPATIONAL THERAPIST

## 2020-01-29 PROCEDURE — 97110 THERAPEUTIC EXERCISES: CPT | Mod: GO | Performed by: OCCUPATIONAL THERAPIST

## 2020-01-29 NOTE — PROGRESS NOTES
SOAP note objective information for 1/29/2020:    Pain Level (Scale 0-10)   1/14/2020 1/23/20 1/29/20   At Rest 0 right  0 left     With Use 9 right  6 left 5-6 right  5-6 left 5-6 right  5-6 left     Palpation  Pain Report:  - none    + mild    ++ moderate    +++ severe    1/14/2020 1/23/20 1/29/20   LEP R:+  L:+ slight R:-  L:- R:-  L:-   Extensors R:+  L:- R:+ tightness  L:+ tightness R:+ tightness  L:+ tightness   Radial wrist/1st dc R:+  L:- R:-  L:- R:-  L:-     Home Exercise Program:  Warmth for stiffness  Ice after activity for pain  MFR to extensors with tennis ball  Forearm E/F stretches  Proximal median nerve gliding for myofascial flexor stretch  Proximal radial nerve gliding for myofascial extensor stretch   and pinch strengthening  Wrist E/F and R/U isometrics   Pec stretch in doorway  Postural exercises with chin tucks and scapular retraction  Postural awareness, avoid rounded shoulders and forward head  Consider OTC wrist splints sleeping    Next Visit:  See in 2 weeks  Assess response to wrist isometrics   Add wrist stabilization as indicated    Please refer to the daily flowsheet for treatment today, total treatment time and time spent performing 1:1 timed codes.

## 2020-02-23 ENCOUNTER — HEALTH MAINTENANCE LETTER (OUTPATIENT)
Age: 35
End: 2020-02-23

## 2020-03-23 PROBLEM — M25.532 PAIN IN BOTH WRISTS: Status: RESOLVED | Noted: 2020-01-14 | Resolved: 2020-03-23

## 2020-03-23 PROBLEM — M25.531 PAIN IN BOTH WRISTS: Status: RESOLVED | Noted: 2020-01-14 | Resolved: 2020-03-23

## 2020-03-23 PROBLEM — M79.641 BILATERAL HAND PAIN: Status: RESOLVED | Noted: 2020-01-14 | Resolved: 2020-03-23

## 2020-03-23 PROBLEM — M79.642 BILATERAL HAND PAIN: Status: RESOLVED | Noted: 2020-01-14 | Resolved: 2020-03-23

## 2020-09-07 ENCOUNTER — NURSE TRIAGE (OUTPATIENT)
Dept: NURSING | Facility: CLINIC | Age: 35
End: 2020-09-07

## 2020-09-07 NOTE — TELEPHONE ENCOUNTER
Tana is calling and states that she has extreme pain for the last couple of days and it was diagnosed a cyst.  For the last couple of days cannot walk or sit.  Abscess is on lower back on top of buttocks.      COVID 19 Nurse Triage Plan/Patient Instructions    Please be aware that novel coronavirus (COVID-19) may be circulating in the community. If you develop symptoms such as fever, cough, or SOB or if you have concerns about the presence of another infection including coronavirus (COVID-19), please contact your health care provider or visit [email or web address].     Disposition/Instructions    ED Visit recommended. Follow protocol based instructions.     Bring Your Own Device:  Please also bring your smart device(s) (smart phones, tablets, laptops) and their charging cables for your personal use and to communicate with your care team during your visit.    Thank you for taking steps to prevent the spread of this virus.  o Limit your contact with others.  o Wear a simple mask to cover your cough.  o Wash your hands well and often.    Resources    [facility]: About COVID-19: [email or web address]    CDC: What to Do If You're Sick: www.cdc.gov/coronavirus/2019-ncov/about/steps-when-sick.html    CDC: Ending Home Isolation: www.cdc.gov/coronavirus/2019-ncov/hcp/disposition-in-home-patients.html     CDC: Caring for Someone: www.cdc.gov/coronavirus/2019-ncov/if-you-are-sick/care-for-someone.html     [facility]: Interim Guidance for Hospital Discharge to Home: www.health.[facility].mn.us/diseases/coronavirus/hcp/hospdischarge.pdf    [facility] clinical trials (COVID-19 research studies): clinicalaffairs.[facility]/umn-clinical-trials     Below are the COVID-19 hotlines at the [facility] ([facility]). Interpreters are available.   o For health questions: Call 801-509-3371 or 1-145.767.8681 (7 a.m. to 7 p.m.)  o For questions about schools and childcare: Call 251-725-0156 or 1-286.500.1059 (7 a.m. to  7 p.m.)                       Reason for Disposition    SEVERE pain (e.g., excruciating)    Additional Information    Negative: [1] Widespread rash AND [2] bright red, sunburn-like AND [3] too weak to stand    Negative: Sounds like a life-threatening emergency to the triager    Negative: Widespread red rash    Negative: Black (necrotic) color or blisters develop in wound    Negative: Patient sounds very sick or weak to the triager    Protocols used: BOIL (SKIN ABSCESS)-A-

## 2020-09-08 ENCOUNTER — TELEPHONE (OUTPATIENT)
Dept: FAMILY MEDICINE | Facility: CLINIC | Age: 35
End: 2020-09-08

## 2020-09-08 NOTE — TELEPHONE ENCOUNTER
Reason for Call:  Other call back    Detailed comments:  Patient went to ER for abbess tooth was given instructions but her after visit is different.  She has some questions please give her call back.    Phone Number Patient can be reached at: Home number on file [phone] (home)    Best Time:  Anytime     Can we leave a detailed message on this number? YES    Call taken on 9/8/2020 at 8:54 AM by Kanwal Joe

## 2020-09-08 NOTE — TELEPHONE ENCOUNTER
I see [place] ER visit yesterday.   Tailbone cyst drained.        Tana Kyle is a 35 y.o. female who presents with pain and swelling around her tailbone. She has evidence of a pilonidal abscess. No evidence of injury, surrounding cellulitis, or anything suggestive of deep space infection. She does not require blood work or any antibiotics, as she had a successful I&D as noted above. She is aware of supportive care measures, to return with increasing pain, spreading of redness, fevers, or new concerns otherwise follow-up with primary physician in the next 2 to 3 days as needed if symptoms are not improving. She is aware to remove the gauze wick in 2 days.    I called patient, she says she had same thing happen 16-18 years ago, had it drained then but she had someone help her clean and pack this daily for a few days.    Says the ER doctor told her to take epsom salt bath 3 times a day and if the gauze has not fallen out in 2 days she was to remove it herself.       She says the discharging nurse had a different plan for taking care of this.   She says the packing fell out during the night last night and was not there when she did her morning bath.   She is scheduled to be seen Friday, wants to see female provider.   Leatha has an opening early on Thursday, patient opted to take that to follow up a day sooner.       Advised she continue the soaks to keep wound from healing over too soon.   Call if having fever, streaking redness.    Amrita Anderson RN  [facility]

## 2020-09-10 ENCOUNTER — OFFICE VISIT (OUTPATIENT)
Dept: FAMILY MEDICINE | Facility: CLINIC | Age: 35
End: 2020-09-10
Payer: COMMERCIAL

## 2020-09-10 VITALS
TEMPERATURE: 98.1 F | WEIGHT: 207.5 LBS | OXYGEN SATURATION: 99 % | HEART RATE: 77 BPM | BODY MASS INDEX: 34.53 KG/M2 | SYSTOLIC BLOOD PRESSURE: 127 MMHG | DIASTOLIC BLOOD PRESSURE: 88 MMHG

## 2020-09-10 DIAGNOSIS — L05.01 PILONIDAL CYST WITH ABSCESS: Primary | ICD-10-CM

## 2020-09-10 DIAGNOSIS — L05.91 PILONIDAL CYST: ICD-10-CM

## 2020-09-10 PROCEDURE — 99213 OFFICE O/P EST LOW 20 MIN: CPT | Performed by: PHYSICIAN ASSISTANT

## 2020-09-10 NOTE — PATIENT INSTRUCTIONS
Take at least 3 sitz baths per day.   Let us know if you develop a fever or have an increase in drainage.    Patient Education     Taking a Sitz Bath  A sitz bath is a type of therapy done by sitting in warm, shallow water. It can help soothe pain, itching, and other symptoms in the anal and genital areas. It can also help keep these areas clean if you can t take a bath or shower. Sitz is from the [place] word sitzen, which means to sit.  Why a sitz bath is done  A sitz bath helps clean and treat certain problems in the anal area, genital area, and the perineum. The perineum is the area between the anus and the vulva in women. In men, it is between the anus and the scrotum. A sitz bath helps increase blood flow to these areas and relax the muscles.  A sitz bath may be done to:    Help ease pain and itching from hemorrhoids    Help ease pain from an anal fissure    Bathe and soothe the perineum after childbirth    Clean and soothe the anal area or perineum after surgery    Ease prostate pain during prostatitis or after a procedure    Help ease period cramps    Clean the anal and genital areas if you can t take a bath or shower  How a sitz bath is done  A sitz bath can be done in 2 ways: in a bathtub or using a sitz bath bowl.  In a bathtub  To take a sitz bath in a tub:    Make sure your bathtub is clean. Fill a clean bathtub with 3 to 4 inches of warm water. In some cases, your healthcare provider may tell you to use cold water instead.    Add salt or medicine to the water if advised by your healthcare provider.    Gently lower yourself down into the bathtub and sit on the bottom of the tub. Don t get into the bath unless the water temperature is comfortable.    Hold on to a railing. Or ask for help from a family member, friend, or caregiver if needed.  If you have a wound, the water may cause pain at first. But the pain should ease. Make sure the area that needs treating is under the water. You can bend your knees  up to help expose the area that needs contact with the water.  Using a sitz bath bowl  A sitz bath bowl is a special plastic container that s placed on a toilet. You can buy this in many drugstores and medical supply stores. To take a sitz bath this way:    Lift the toilet lid and seat. Place a cleaned plastic sitz bath bowl on the rim of your toilet. Make sure the bowl is firmly in place and won t move around.    Fill the sitz bath bowl with warm water from a pitcher or other container. The water should cover your perineum. Make sure the water temperature is comfortable.    Add salt or medicine to the water if advised by your healthcare provider. Or follow the package instructions about how to fill the bowl. Some kits come with a plastic bag and tubing. This lets you stream water into the bowl and at the area of your body that needs treatment. The bowl may have a slot or hole in the back. This lets water flow out so it doesn t overflow onto the floor. If there is no hole, be careful not to fill the bowl too full.    Gently sit down on the sitz bath bowl. Hold on to a railing. Or ask for help from a family member, friend, or caregiver if needed.  If you have a wound, the water may cause pain at first, but the pain should ease. Make sure the area that needs treating is under the water.  For any type of sitz bath:  1. Sit in the water for 10 to 20 minutes.  2. Add more warm water as needed to keep the water comfortable.  3. Get up slowly from the tub or toilet. You may feel lightheaded or dizzy. Hold on to a railing. Or ask for help from a family member, friend, or caregiver if needed.  4. Gently pat your anal area, perineum, and genitals dry with a clean towel. Don t rub the area.  5. Wash your hands. Put any ointment or cream on the area, if advised.  6. Wash the bathtub or sitz bath bowl with soap and water after each use.  7. Use a sitz bath 2 to 3 times a day, or as often as your healthcare provider  advises.  When to call your healthcare provider  Call your healthcare provider right away if you have any of these:    Fever of 100.4 F (38 C) or higher, or as directed    Redness, swelling, or fluid leaking from a cut (incision) that gets worse    Pain that gets worse    Symptoms that don t get better, or get worse    New symptoms  Date Last Reviewed: 5/1/2016 2000-2019 [employer]. [address] [place] [address], [place] [address]. All rights reserved. This information is not intended as a substitute for professional medical care. Always follow your healthcare professional's instructions.           Patient Education     Pilonidal Cyst    A pilonidal cyst is found near the base of the spine (tailbone) or top of the buttocks crease. It may look like a pit or small depression. In some cases, it may have a hollow tunnel (sinus tract) that connects it to the surface of the skin. Normally, a pilonidal cyst does not cause symptoms. But if it becomes infected, it can cause pain and swelling. This sheet tells you more about pilonidal cysts and how they are treated.  What causes a pilonidal cyst and who gets them?  Two main causes are:    Ingrown hairs. This happens when a hair is forced under the skin or when a hair follicle ruptures.    Injury to the area. This can happen from sitting for long periods of time.  These cysts are often diagnosed in people between ages 16 and 26. But people of any age can have a pilonidal cyst. They affect both men and women, but they are more common in men.  Symptoms of a pilonidal cyst infection  A pilonidal cyst may not cause symptoms unless it becomes infected or inflamed. Once a pilonidal cyst becomes infected, it is called a pilonidal abscess. Infection may cause the following symptoms:    Pain, redness, and swelling of the cyst and area around it    Foul-smelling drainage from the cyst    Fever  Diagnosing a pilonidal cyst  A pilonidal cyst can be diagnosed by how it looks and  by its location. Your healthcare provider will examine the suspected cyst to confirm a diagnosis. You will be told if any tests are needed.  Treating a pilonidal cyst infection  Most pilonidal cysts are left alone. But if a cyst becomes infected or inflamed, treatment is needed. It may include the following:    Incision and drainage. If needed, the cyst is cut open, and pus and other infected material is allowed to drain.    Antibiotic medicines for the infection. Know that medicines do not make the cyst go away, and antibiotics have limited use in treating an abscess. They also won t keep a cyst from becoming infected again.    Hot water soaks. These can help draw out the infection and ease pain and itching.    Surgery to remove the cyst (excision). This may be done if the infection is severe, does not respond to medicine, or keeps coming back. A surgeon cuts and removes the cyst and the tissue around it. Your healthcare provider can tell you more if this is needed.    Laser hair removalaround the area. This may decrease the frequency of flare-ups.  Preventing infection  A pilonidal cyst can easily become infected. Do the following to help prevent infections:    Keep the cyst and surrounding skin area clean.    Remove hair from the area of the cyst regularly. Ask your healthcare provider about safe hair removal products or procedures.    Avoid sitting in one position for long periods of time. This helps to reduce weight and pressure on your tailbone area. Sitting on a special cushion to relieve pressure on the tailbone may also help. Ask your healthcare provider about where to purchase these cushions.    Avoid tight-fitting clothing to reduce skin irritation around the cyst.  Date Last Reviewed: 2/1/2017 2000-2019 [employer]. [address] [place] [address], [place] [address]. All rights reserved. This information is not intended as a substitute for professional medical care. Always follow your healthcare  professional's instructions.

## 2020-09-10 NOTE — PROGRESS NOTES
Subjective     Tana Kyle is a 35 year old female who presents to clinic today for the following health issues:    HPI       ED/UC Followup:    Facility:  [facility]  Date of visit: 9/7/20  Reason for visit: abscess  Current Status: felt okay yesterday and there is still drainage but very little     [place] similar [place]     Today reports no fever, chills. She has been taking 3 sitz baths per day. She reports the wick came out on its own - probably within 24 hours.   She has noted some drainage - enough to keep putting guaze over the area to protect her clothing. Has noted some drainage on her sheets when she wakes up in the morning.     Review of Systems   Constitutional, HEENT, cardiovascular, pulmonary, gi and gu systems are negative, except as otherwise noted.      Objective    /88 (BP Location: Right arm, Patient Position: Chair, Cuff Size: Adult Regular)   Pulse 77   Temp 98.1  F (36.7  C) (Oral)   Wt 94.1 kg (207 lb 8 oz)   SpO2 99%   BMI 34.53 kg/m    Body mass index is 34.53 kg/m .  Physical Exam   GENERAL: healthy, alert and no distress  NECK: no adenopathy, no asymmetry, masses, or scars and thyroid normal to palpation  RESP: lungs clear to auscultation - no rales, rhonchi or wheezes  CV: regular rate and rhythm, normal S1 S2, no S3 or S4, no murmur, click or rub, no peripheral edema and peripheral pulses strong  ABDOMEN: soft, nontender, no hepatosplenomegaly, no masses and bowel sounds normal  MS: no gross musculoskeletal defects noted, no edema  SKIN: 5 mm opening of left sided superior gluteal cleft pilonidal cyst with scant purulent drainage, 2 cm surrounding induration        Assessment & Plan     Pilonidal cyst with abscess  Pilonidal cyst  Healing well at this time. Will continue with current cares of 3 sitz baths per day. She will keep area clean and continue to cover as needed. Asked her to let us know if she develops fever, increased  "drainage, or increased warmth around the area. Recommend she return in one week to recheck. Discussed if becomes recurrent - sometimes requires general surgery referral.        BMI:   Estimated body mass index is 34.53 kg/m  as calculated from the following:    Height as of 1/3/20: 1.651 m (5' 5\").    Weight as of this encounter: 94.1 kg (207 lb 8 oz).   Weight management plan: Discussed healthy diet and exercise guidelines      Return in about 1 week (around 9/17/2020).    Leatha Moss PA-C  [facility]    "

## 2020-09-17 ENCOUNTER — OFFICE VISIT (OUTPATIENT)
Dept: FAMILY MEDICINE | Facility: CLINIC | Age: 35
End: 2020-09-17
Payer: COMMERCIAL

## 2020-09-17 VITALS
SYSTOLIC BLOOD PRESSURE: 121 MMHG | BODY MASS INDEX: 33.99 KG/M2 | WEIGHT: 204 LBS | HEART RATE: 75 BPM | OXYGEN SATURATION: 98 % | TEMPERATURE: 97.8 F | DIASTOLIC BLOOD PRESSURE: 81 MMHG | HEIGHT: 65 IN

## 2020-09-17 DIAGNOSIS — Z23 NEED FOR VACCINE FOR DT (DIPHTHERIA-TETANUS): ICD-10-CM

## 2020-09-17 DIAGNOSIS — H60.501 ACUTE NON-INFECTIVE OTITIS EXTERNA OF RIGHT EAR, UNSPECIFIED TYPE: ICD-10-CM

## 2020-09-17 DIAGNOSIS — Z13.220 SCREENING CHOLESTEROL LEVEL: ICD-10-CM

## 2020-09-17 DIAGNOSIS — Z12.4 PAP SMEAR FOR CERVICAL CANCER SCREENING: ICD-10-CM

## 2020-09-17 DIAGNOSIS — Z00.00 ROUTINE GENERAL MEDICAL EXAMINATION AT A HEALTH CARE FACILITY: Primary | ICD-10-CM

## 2020-09-17 DIAGNOSIS — Z13.1 SCREENING FOR DIABETES MELLITUS: ICD-10-CM

## 2020-09-17 DIAGNOSIS — L05.01 PILONIDAL CYST WITH ABSCESS: ICD-10-CM

## 2020-09-17 LAB
ANION GAP SERPL CALCULATED.3IONS-SCNC: 6 MMOL/L (ref 3–14)
BUN SERPL-MCNC: 12 MG/DL (ref 7–30)
CALCIUM SERPL-MCNC: 9 MG/DL (ref 8.5–10.1)
CHLORIDE SERPL-SCNC: 106 MMOL/L (ref 94–109)
CHOLEST SERPL-MCNC: 121 MG/DL
CO2 SERPL-SCNC: 27 MMOL/L (ref 20–32)
CREAT SERPL-MCNC: 0.79 MG/DL (ref 0.52–1.04)
ERYTHROCYTE [DISTWIDTH] IN BLOOD BY AUTOMATED COUNT: 12.1 % (ref 10–15)
GFR SERPL CREATININE-BSD FRML MDRD: >90 ML/MIN/{1.73_M2}
GLUCOSE SERPL-MCNC: 81 MG/DL (ref 70–99)
HBA1C MFR BLD: 5.4 % (ref 0–5.6)
HCT VFR BLD AUTO: 42.2 % (ref 35–47)
HDLC SERPL-MCNC: 47 MG/DL
HGB BLD-MCNC: 13.7 G/DL (ref 11.7–15.7)
LDLC SERPL CALC-MCNC: 61 MG/DL
MCH RBC QN AUTO: 27.1 PG (ref 26.5–33)
MCHC RBC AUTO-ENTMCNC: 32.5 G/DL (ref 31.5–36.5)
MCV RBC AUTO: 83 FL (ref 78–100)
NONHDLC SERPL-MCNC: 74 MG/DL
PLATELET # BLD AUTO: 234 10E9/L (ref 150–450)
POTASSIUM SERPL-SCNC: 4.2 MMOL/L (ref 3.4–5.3)
RBC # BLD AUTO: 5.06 10E12/L (ref 3.8–5.2)
SODIUM SERPL-SCNC: 139 MMOL/L (ref 133–144)
TRIGL SERPL-MCNC: 63 MG/DL
WBC # BLD AUTO: 5.6 10E9/L (ref 4–11)

## 2020-09-17 PROCEDURE — 85027 COMPLETE CBC AUTOMATED: CPT | Performed by: PHYSICIAN ASSISTANT

## 2020-09-17 PROCEDURE — 90714 TD VACC NO PRESV 7 YRS+ IM: CPT | Performed by: PHYSICIAN ASSISTANT

## 2020-09-17 PROCEDURE — 83036 HEMOGLOBIN GLYCOSYLATED A1C: CPT | Performed by: PHYSICIAN ASSISTANT

## 2020-09-17 PROCEDURE — 36415 COLL VENOUS BLD VENIPUNCTURE: CPT | Performed by: PHYSICIAN ASSISTANT

## 2020-09-17 PROCEDURE — G0145 SCR C/V CYTO,THINLAYER,RESCR: HCPCS | Performed by: PHYSICIAN ASSISTANT

## 2020-09-17 PROCEDURE — 80048 BASIC METABOLIC PNL TOTAL CA: CPT | Performed by: PHYSICIAN ASSISTANT

## 2020-09-17 PROCEDURE — 87624 HPV HI-RISK TYP POOLED RSLT: CPT | Performed by: PHYSICIAN ASSISTANT

## 2020-09-17 PROCEDURE — 80061 LIPID PANEL: CPT | Performed by: PHYSICIAN ASSISTANT

## 2020-09-17 PROCEDURE — 99213 OFFICE O/P EST LOW 20 MIN: CPT | Mod: 25 | Performed by: PHYSICIAN ASSISTANT

## 2020-09-17 PROCEDURE — 90471 IMMUNIZATION ADMIN: CPT | Performed by: PHYSICIAN ASSISTANT

## 2020-09-17 PROCEDURE — 99395 PREV VISIT EST AGE 18-39: CPT | Mod: 25 | Performed by: PHYSICIAN ASSISTANT

## 2020-09-17 RX ORDER — CIPROFLOXACIN AND DEXAMETHASONE 3; 1 MG/ML; MG/ML
4 SUSPENSION/ DROPS AURICULAR (OTIC) 2 TIMES DAILY
Qty: 7.5 ML | Refills: 0 | Status: SHIPPED | OUTPATIENT
Start: 2020-09-17 | End: 2020-09-24

## 2020-09-17 ASSESSMENT — MIFFLIN-ST. JEOR: SCORE: 1607.47

## 2020-09-17 NOTE — PROGRESS NOTES
SUBJECTIVE:   CC: Tana Kyle is an 35 year old woman who presents for preventive health visit.       Patient has been advised of split billing requirements and indicates understanding: Yes  Healthy Habits:    Do you get at least three servings of calcium containing foods daily (dairy, green leafy vegetables, etc.)? yes    Amount of exercise or daily activities, outside of work: 0    Problems taking medications regularly No    Medication side effects: No    Have you had an eye exam in the past two years? no    Do you see a dentist twice per year? yes    Do you have sleep apnea, excessive snoring or daytime drowsiness?no    Pilonidal cyst follow up. Feels it closed up late last week. The area is hard, but is not painful.    Due for tdap, pap. Gets flu shot at work.     Has some ear issues. Eczema issues per ENT? Has been itchy for a few weeks. Right ear more today than the left.    Has been trying to lose weight. Eating clean, increasing activity. Has lost quite a bit of weight thus far.    Has an IUD 07/2017. Has mild spotting intermittently. Periods/bleeding have been irregular.    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    Sexually active - 1 partner in last 3 years. No concerns for STDs.    Today's PHQ-2 Score:   PHQ-2 ( 1999 Pf[employer]) 9/17/2020 9/10/2020   Q1: Little interest or pleasure in doing things 0 0   Q2: Feeling down, depressed or hopeless 0 0   PHQ-2 Score 0 0       Abuse: Current or Past(Physical, Sexual or Emotional)- No  Do you feel safe in your environment? Yes        Social History     Tobacco Use     Smoking status: Never Smoker     Smokeless tobacco: Never Used   Substance Use Topics     Alcohol use: No     If you drink alcohol do you typically have >3 drinks per day or >7 drinks per week? Not Applicable                     Reviewed orders with patient.  Reviewed health maintenance and updated orders accordingly - Yes  BP Readings from Last 3 Encounters:   09/17/20 121/81   09/10/20 127/88  "  01/03/20 125/82    Wt Readings from Last 3 Encounters:   09/17/20 92.5 kg (204 lb)   09/10/20 94.1 kg (207 lb 8 oz)   01/03/20 92.4 kg (203 lb 12.8 oz)                    Mammogram not appropriate for this patient based on age.    Pertinent mammograms are reviewed under the imaging tab.  History of abnormal Pap smear: NO - age 30- 65 PAP every 3 years recommended  PAP / HPV Latest Ref Rng & Units 2/24/2016 5/25/2010   PAP - NIL NIL   HPV 16 DNA NEG Negative -   HPV 18 DNA NEG Negative -   OTHER HR HPV NEG Negative -     Reviewed and updated as needed this visit by clinical staff  Tobacco  Allergies  Meds  Med Hx  Surg Hx  Fam Hx  Soc Hx        Reviewed and updated as needed this visit by Provider          ROS:  CONSTITUTIONAL: NEGATIVE for fever, chills, change in weight  INTEGUMENTARU/SKIN: NEGATIVE for worrisome rashes, moles or lesions  EYES: NEGATIVE for vision changes or irritation  ENT: NEGATIVE for ear, mouth and throat problems  RESP: NEGATIVE for significant cough or SOB  BREAST: NEGATIVE for masses, tenderness or discharge  CV: NEGATIVE for chest pain, palpitations or peripheral edema  GI: NEGATIVE for nausea, abdominal pain, heartburn, or change in bowel habits   female: no unusual urinary symptoms, irregular vaginal bleeding with IUD  MUSCULOSKELETAL: NEGATIVE for significant arthralgias or myalgia  NEURO: NEGATIVE for weakness, dizziness or paresthesias  PSYCHIATRIC: NEGATIVE for changes in mood or affect    OBJECTIVE:   /81 (BP Location: Right arm, Patient Position: Chair, Cuff Size: Adult Regular)   Pulse 75   Temp 97.8  F (36.6  C) (Oral)   Ht 1.645 m (5' 4.76\")   Wt 92.5 kg (204 lb)   SpO2 98%   BMI 34.20 kg/m    EXAM:  GENERAL: healthy, alert and no distress  EYES: Eyes grossly normal to inspection, PERRL and conjunctivae and sclerae normal  HENT: normal cephalic/atraumatic, right ear: erythematous canal, normal TM; left ear: normal TM, nose and mouth without ulcers or " lesions, oropharynx clear and oral mucous membranes moist  NECK: no adenopathy, no asymmetry, masses, or scars and thyroid normal to palpation  RESP: lungs clear to auscultation - no rales, rhonchi or wheezes  BREAST: normal without masses, tenderness or nipple discharge and no palpable axillary masses or adenopathy  CV: regular rate and rhythm, normal S1 S2, no S3 or S4, no murmur, click or rub, no peripheral edema and peripheral pulses strong  ABDOMEN: soft, nontender, no hepatosplenomegaly, no masses and bowel sounds normal   (female): normal female external genitalia, normal urethral meatus, vaginal mucosa pink, moist, well rugated, and normal cervix/adnexa/uterus without masses or discharge  MS: no gross musculoskeletal defects noted, no edema  SKIN: 2 cm nontender left sided gluteal cleft induration  NEURO: Normal strength and tone, mentation intact and speech normal  PSYCH: mentation appears normal, affect normal/bright    Diagnostic Test Results:  Labs reviewed in Epic  Results for orders placed or performed in visit on 09/17/20 (from the past 24 hour(s))   CBC with platelets   Result Value Ref Range    WBC 5.6 4.0 - 11.0 10e9/L    RBC Count 5.06 3.8 - 5.2 10e12/L    Hemoglobin 13.7 11.7 - 15.7 g/dL    Hematocrit 42.2 35.0 - 47.0 %    MCV 83 78 - 100 fl    MCH 27.1 26.5 - 33.0 pg    MCHC 32.5 31.5 - 36.5 g/dL    RDW 12.1 10.0 - 15.0 %    Platelet Count 234 150 - 450 10e9/L   Hemoglobin A1c   Result Value Ref Range    Hemoglobin A1C 5.4 0 - 5.6 %       ASSESSMENT/PLAN:   1. Routine general medical examination at a health care facility  Well person  - TD (ADULT, 7+) PRESERVE FREE  - VACCINE ADMINISTRATION, INITIAL  - CBC with platelets  - Basic metabolic panel  - Lipid panel reflex to direct LDL Fasting  - Hemoglobin A1c  - Pap imaged thin layer screen with HPV - recommended age 30 - 65 years (select HPV order below)  - HPV High Risk Types DNA Cervical    2. Pilonidal cyst with abscess  Improved from  "previous. Slight erythema noted. Expect this to resolve in the next 4-6 weeks. Return to clinic if not improving or becomes more painful.    3. Acute non-infective otitis externa of right ear, unspecified type  Mild otitis externa. With current symptoms - will treat for 7 days.   - ciprofloxacin-dexamethasone (CIPRODEX) 0.3-0.1 % otic suspension; Place 4 drops into the right ear 2 times daily for 7 days  Dispense: 7.5 mL; Refill: 0    4. Need for vaccine for DT (diphtheria-tetanus)  Screening  - TD (ADULT, 7+) PRESERVE FREE  - VACCINE ADMINISTRATION, INITIAL    5. Pap smear for cervical cancer screening  Screening  - Pap imaged thin layer screen with HPV - recommended age 30 - 65 years (select HPV order below)  - HPV High Risk Types DNA Cervical    6. Screening for diabetes mellitus  Screening  - Hemoglobin A1c    7. Screening cholesterol level  Screening.  - Lipid panel reflex to direct LDL Fasting    Patient has been advised of split billing requirements and indicates understanding: Yes  COUNSELING:   Special attention given to:        Regular exercise       Healthy diet/nutrition    Estimated body mass index is 34.2 kg/m  as calculated from the following:    Height as of this encounter: 1.645 m (5' 4.76\").    Weight as of this encounter: 92.5 kg (204 lb).    Weight management plan: Discussed healthy diet and exercise guidelines    She reports that she has never smoked. She has never used smokeless tobacco.      Counseling Resources:  ATP IV Guidelines  Pooled Cohorts Equation Calculator  Breast Cancer Risk Calculator  BRCA-Related Cancer Risk Assessment: FHS-7 Tool  FRAX Risk Assessment  ICSI Preventive Guidelines  Dietary Guidelines for Americans, 2010  USDA's MyPlate  ASA Prophylaxis  Lung CA Screening    Leatha Moss PA-C  [facility]  "

## 2020-09-17 NOTE — NURSING NOTE
Prior to immunization administration, verified patients identity using patient s name and date of birth. Please see Immunization Activity for additional information.     Screening Questionnaire for Adult Immunization    Are you sick today?   No   Do you have allergies to medications, food, a vaccine component or latex?   No   Have you ever had a serious reaction after receiving a vaccination?   No   Do you have a long-term health problem with heart, lung, kidney, or metabolic disease (e.g., diabetes), asthma, a blood disorder, no spleen, complement component deficiency, a cochlear implant, or a spinal fluid leak?  Are you on long-term aspirin therapy?   No   Do you have cancer, leukemia, HIV/AIDS, or any other immune system problem?   No   Do you have a parent, brother, or sister with an immune system problem?   No   In the past 3 months, have you taken medications that affect  your immune system, such as prednisone, other steroids, or anticancer drugs; drugs for the treatment of rheumatoid arthritis, Crohn s disease, or psoriasis; or have you had radiation treatments?   No   Have you had a seizure, or a brain or other nervous system problem?   No   During the past year, have you received a transfusion of blood or blood    products, or been given immune (gamma) globulin or antiviral drug?   No   For women: Are you pregnant or is there a chance you could become       pregnant during the next month?   No   Have you received any vaccinations in the past 4 weeks?   No     Immunization questionnaire answers were all negative.      Patient instructed to remain in clinic for 15 minutes afterwards, and to report any adverse reaction to me immediately.  Vaccine information supplied for td.  Verified patient's name and date of birth prior to injection.     Screening performed by Shima Benton MA on 9/17/2020 at 7:24 AM.

## 2020-09-21 LAB
COPATH REPORT: NORMAL
PAP: NORMAL

## 2020-09-23 LAB
FINAL DIAGNOSIS: NORMAL
HPV HR 12 DNA CVX QL NAA+PROBE: NEGATIVE
HPV16 DNA SPEC QL NAA+PROBE: NEGATIVE
HPV18 DNA SPEC QL NAA+PROBE: NEGATIVE
SPECIMEN DESCRIPTION: NORMAL
SPECIMEN SOURCE CVX/VAG CYTO: NORMAL

## 2020-10-06 ENCOUNTER — MYC MEDICAL ADVICE (OUTPATIENT)
Dept: FAMILY MEDICINE | Facility: CLINIC | Age: 35
End: 2020-10-06

## 2020-10-09 ENCOUNTER — OFFICE VISIT (OUTPATIENT)
Dept: FAMILY MEDICINE | Facility: CLINIC | Age: 35
End: 2020-10-09
Payer: COMMERCIAL

## 2020-10-09 VITALS
HEART RATE: 78 BPM | OXYGEN SATURATION: 99 % | SYSTOLIC BLOOD PRESSURE: 120 MMHG | BODY MASS INDEX: 34.03 KG/M2 | WEIGHT: 203 LBS | TEMPERATURE: 98.1 F | DIASTOLIC BLOOD PRESSURE: 82 MMHG

## 2020-10-09 DIAGNOSIS — R59.0 CERVICAL LYMPHADENOPATHY: ICD-10-CM

## 2020-10-09 DIAGNOSIS — H69.92 DYSFUNCTION OF LEFT EUSTACHIAN TUBE: ICD-10-CM

## 2020-10-09 DIAGNOSIS — H60.543 ACUTE ECZEMATOID OTITIS EXTERNA OF BOTH EARS: Primary | ICD-10-CM

## 2020-10-09 PROCEDURE — 99213 OFFICE O/P EST LOW 20 MIN: CPT | Performed by: NURSE PRACTITIONER

## 2020-10-09 RX ORDER — CIPROFLOXACIN AND DEXAMETHASONE 3; 1 MG/ML; MG/ML
4 SUSPENSION/ DROPS AURICULAR (OTIC) 2 TIMES DAILY
Qty: 7.5 ML | Refills: 1 | Status: SHIPPED | OUTPATIENT
Start: 2020-10-09 | End: 2020-12-24

## 2020-10-09 RX ORDER — DEXAMETHASONE SODIUM PHOSPHATE 1 MG/ML
1-2 SOLUTION/ DROPS OPHTHALMIC 3 TIMES DAILY PRN
Qty: 5 ML | Refills: 1 | Status: SHIPPED | OUTPATIENT
Start: 2020-10-09 | End: 2020-10-09 | Stop reason: ALTCHOICE

## 2020-10-09 RX ORDER — FLUTICASONE PROPIONATE 50 MCG
2 SPRAY, SUSPENSION (ML) NASAL DAILY
Qty: 16 G | Refills: 2 | Status: SHIPPED | OUTPATIENT
Start: 2020-10-09 | End: 2021-07-19

## 2020-10-09 NOTE — PROGRESS NOTES
Subjective     Tana Kyle is a 35 year old female who presents to clinic today for the following health issues:    HPI         Concern - Ear Problem    Onset: 3-4 weeks  Description: Bilateral Ear Pain  Intensity: moderate  Progression of Symptoms:  worsening  Accompanying Signs & Symptoms: Ears Itchy, Swollen glands and Jaw Pain  Previous history of similar problem: saw a physician at [facility] for same thing  Precipitating factors:        Worsened by: none  Alleviating factors:        Improved by: none  Therapies tried and outcome: Ear drops which helped a little but not completely    Drops helped both ears with itching. Left ear feels wet or fluid-like. Left gland swollen and painful- still swollen but no longer painful. Occasional nausea and malaise. No fever. Symptoms have worsened again in the past few days.      Review of Systems   Constitutional, HEENT, cardiovascular, pulmonary, gi and gu systems are negative, except as otherwise noted.      Objective    /82 (BP Location: Right arm, Patient Position: Chair, Cuff Size: Adult Regular)   Pulse 78   Temp 98.1  F (36.7  C) (Oral)   Wt 92.1 kg (203 lb)   SpO2 99%   BMI 34.03 kg/m    Body mass index is 34.03 kg/m .  Physical Exam   GENERAL: healthy, alert and no distress  HENT: normal cephalic/atraumatic, both ears: canals and TM with small white flecks covering these surfaces, nose and mouth without ulcers or lesions, oropharynx clear and oral mucous membranes moist  NECK: cervical adenopathy left anterior enlarged solitary node    No results found for any visits on 10/09/20.        Assessment & Plan     Acute eczematoid otitis externa of both ears  Will repeat course of Ciprodex, if not better then follow up with ENT, question candidal or fungal etiology.  - OTOLARYNGOLOGY REFERRAL  - ciprofloxacin-dexamethasone (CIPRODEX) 0.3-0.1 % otic suspension; Place 4 drops into both ears 2 times daily for 7 days Repeat as needed for  flares    Cervical lymphadenopathy  Reactive to otitis externa, recommend Ibuprofen 600 mg three times per day x3-5 day. Follow-up is not better.    Dysfunction of left eustachian tube    - fluticasone (FLONASE) 50 MCG/ACT nasal spray; Spray 2 sprays into both nostrils daily        See Patient Instructions    No follow-ups on file.    BISMARK [facility]

## 2020-12-24 ENCOUNTER — OFFICE VISIT (OUTPATIENT)
Dept: FAMILY MEDICINE | Facility: CLINIC | Age: 35
End: 2020-12-24
Payer: COMMERCIAL

## 2020-12-24 VITALS
BODY MASS INDEX: 34.03 KG/M2 | SYSTOLIC BLOOD PRESSURE: 131 MMHG | WEIGHT: 203 LBS | DIASTOLIC BLOOD PRESSURE: 83 MMHG | TEMPERATURE: 98.4 F | HEART RATE: 79 BPM

## 2020-12-24 DIAGNOSIS — H60.543 ACUTE ECZEMATOID OTITIS EXTERNA OF BOTH EARS: ICD-10-CM

## 2020-12-24 DIAGNOSIS — H66.005 RECURRENT ACUTE SUPPURATIVE OTITIS MEDIA WITHOUT SPONTANEOUS RUPTURE OF LEFT TYMPANIC MEMBRANE: Primary | ICD-10-CM

## 2020-12-24 DIAGNOSIS — H69.92 DYSFUNCTION OF LEFT EUSTACHIAN TUBE: ICD-10-CM

## 2020-12-24 PROCEDURE — 99213 OFFICE O/P EST LOW 20 MIN: CPT | Performed by: PHYSICIAN ASSISTANT

## 2020-12-24 RX ORDER — CIPROFLOXACIN AND DEXAMETHASONE 3; 1 MG/ML; MG/ML
4 SUSPENSION/ DROPS AURICULAR (OTIC) 2 TIMES DAILY
Qty: 7.5 ML | Refills: 1 | Status: SHIPPED | OUTPATIENT
Start: 2020-12-24 | End: 2021-07-19

## 2020-12-24 RX ORDER — FLUTICASONE PROPIONATE 50 MCG
2 SPRAY, SUSPENSION (ML) NASAL DAILY
Qty: 16 G | Refills: 2 | Status: CANCELLED | OUTPATIENT
Start: 2020-12-24

## 2020-12-24 RX ORDER — AMOXICILLIN 875 MG
875 TABLET ORAL 2 TIMES DAILY
Qty: 20 TABLET | Refills: 0 | Status: SHIPPED | OUTPATIENT
Start: 2020-12-24 | End: 2021-07-19

## 2020-12-24 NOTE — PROGRESS NOTES
Subjective     Tana Kyle is a 35 year old female who presents to clinic today for the following health issues:    HPI         Acute Illness  Acute illness concerns: Ear pain  Onset/Duration: x9 days  Symptoms:  Fever: no  Chills/Sweats: no  Headache (location?): no  Sinus Pressure: no  Conjunctivitis:  no  Ear Pain: YES: both  Rhinorrhea: no  Congestion: no  Sore Throat: no  Cough: no  Wheeze: no  Decreased Appetite: no  Nausea: no  Vomiting: no  Diarrhea: no  Dysuria/Freq.: no  Dysuria or Hematuria: no  Fatigue/Achiness: no  Sick/Strep Exposure: no  Therapies tried and outcome: OTC Tylenol    Was feeling sick on 12/15. Felt feverish and nauseous, chills. Fever 101+. Took a covid test through [facility] on 12/15. Was negative, but she did have symptoms for about one week.  Fever resolved 12/19. IImproved, but continues to have ear pain/fullness. More on left.     Review of Systems   Constitutional, HEENT, cardiovascular, pulmonary, gi and gu systems are negative, except as otherwise noted.      Objective    /83 (BP Location: Right arm, Patient Position: Chair, Cuff Size: Adult Large)   Pulse 79   Temp 98.4  F (36.9  C) (Oral)   Wt 92.1 kg (203 lb)   Breastfeeding No   BMI 34.03 kg/m    Body mass index is 34.03 kg/m .  Physical Exam   GENERAL: healthy, alert and no distress  HENT: normal cephalic/atraumatic, right ear: erythematous, left ear: erythematous and mucopurulent effusion, nose and mouth without ulcers or lesions, oropharynx clear and oral mucous membranes moist  NECK: no adenopathy, no asymmetry, masses, or scars and thyroid normal to palpation  RESP: lungs clear to auscultation - no rales, rhonchi or wheezes  CV: regular rate and rhythm, normal S1 S2, no S3 or S4, no murmur, click or rub, no peripheral edema and peripheral pulses strong  MS: no gross musculoskeletal defects noted, no edema        Assessment & Plan     Recurrent acute suppurative otitis media without spontaneous rupture of left  tympanic membrane  Acute eczematoid otitis externa of both ears  Dysfunction of left eustachian tube  Will treat with drops and oral antibiotic. Recurrent infection, recurrent eczema which hasn't been responding. Patient agrees with plan. If ongoing, suggest referral to ENT - this was ordered at her last visit about this, but she did not go.  - ciprofloxacin-dexamethasone (CIPRODEX) 0.3-0.1 % otic suspension; Place 4 drops into both ears 2 times daily Repeat as needed for flares  - amoxicillin (AMOXIL) 875 MG tablet; Take 1 tablet (875 mg) by mouth 2 times daily      Return if symptoms worsen or fail to improve.    Leatha Moss PA-C  [facility]

## 2021-04-17 ENCOUNTER — HEALTH MAINTENANCE LETTER (OUTPATIENT)
Age: 36
End: 2021-04-17

## 2021-07-19 ENCOUNTER — OFFICE VISIT (OUTPATIENT)
Dept: FAMILY MEDICINE | Facility: CLINIC | Age: 36
End: 2021-07-19
Payer: COMMERCIAL

## 2021-07-19 VITALS
DIASTOLIC BLOOD PRESSURE: 76 MMHG | HEART RATE: 93 BPM | SYSTOLIC BLOOD PRESSURE: 125 MMHG | TEMPERATURE: 98.9 F | OXYGEN SATURATION: 98 % | WEIGHT: 207.05 LBS | BODY MASS INDEX: 34.71 KG/M2

## 2021-07-19 DIAGNOSIS — R63.5 WEIGHT GAIN: ICD-10-CM

## 2021-07-19 DIAGNOSIS — Q66.52 CONGENITAL PES PLANUS OF BOTH FEET: ICD-10-CM

## 2021-07-19 DIAGNOSIS — M25.371 ANKLE INSTABILITY, RIGHT: Primary | ICD-10-CM

## 2021-07-19 DIAGNOSIS — Q66.51 CONGENITAL PES PLANUS OF BOTH FEET: ICD-10-CM

## 2021-07-19 DIAGNOSIS — M25.50 MULTIPLE JOINT PAIN: ICD-10-CM

## 2021-07-19 LAB
ANION GAP SERPL CALCULATED.3IONS-SCNC: 3 MMOL/L (ref 3–14)
BUN SERPL-MCNC: 13 MG/DL (ref 7–30)
CALCIUM SERPL-MCNC: 9.3 MG/DL (ref 8.5–10.1)
CHLORIDE BLD-SCNC: 105 MMOL/L (ref 94–109)
CO2 SERPL-SCNC: 28 MMOL/L (ref 20–32)
CREAT SERPL-MCNC: 0.74 MG/DL (ref 0.52–1.04)
CRP SERPL-MCNC: <2.9 MG/L (ref 0–8)
ERYTHROCYTE [DISTWIDTH] IN BLOOD BY AUTOMATED COUNT: 12.6 % (ref 10–15)
ERYTHROCYTE [SEDIMENTATION RATE] IN BLOOD BY WESTERGREN METHOD: 19 MM/HR (ref 0–20)
GFR SERPL CREATININE-BSD FRML MDRD: >90 ML/MIN/1.73M2
GLUCOSE BLD-MCNC: 79 MG/DL (ref 70–99)
HCT VFR BLD AUTO: 41.9 % (ref 35–47)
HGB BLD-MCNC: 13.8 G/DL (ref 11.7–15.7)
MCH RBC QN AUTO: 27.3 PG (ref 26.5–33)
MCHC RBC AUTO-ENTMCNC: 32.9 G/DL (ref 31.5–36.5)
MCV RBC AUTO: 83 FL (ref 78–100)
PLATELET # BLD AUTO: 246 10E3/UL (ref 150–450)
POTASSIUM BLD-SCNC: 4.1 MMOL/L (ref 3.4–5.3)
RBC # BLD AUTO: 5.06 10E6/UL (ref 3.8–5.2)
SODIUM SERPL-SCNC: 136 MMOL/L (ref 133–144)
T3FREE SERPL-MCNC: 3 PG/ML (ref 2.3–4.2)
T4 FREE SERPL-MCNC: 1.29 NG/DL (ref 0.76–1.46)
TSH SERPL DL<=0.005 MIU/L-ACNC: 2.21 MU/L (ref 0.4–4)
WBC # BLD AUTO: 8.9 10E3/UL (ref 4–11)

## 2021-07-19 PROCEDURE — 84439 ASSAY OF FREE THYROXINE: CPT | Performed by: PHYSICIAN ASSISTANT

## 2021-07-19 PROCEDURE — 86140 C-REACTIVE PROTEIN: CPT | Performed by: PHYSICIAN ASSISTANT

## 2021-07-19 PROCEDURE — 84443 ASSAY THYROID STIM HORMONE: CPT | Performed by: PHYSICIAN ASSISTANT

## 2021-07-19 PROCEDURE — 99214 OFFICE O/P EST MOD 30 MIN: CPT | Performed by: PHYSICIAN ASSISTANT

## 2021-07-19 PROCEDURE — 86431 RHEUMATOID FACTOR QUANT: CPT | Performed by: PHYSICIAN ASSISTANT

## 2021-07-19 PROCEDURE — 85652 RBC SED RATE AUTOMATED: CPT | Performed by: PHYSICIAN ASSISTANT

## 2021-07-19 PROCEDURE — 86039 ANTINUCLEAR ANTIBODIES (ANA): CPT | Performed by: PHYSICIAN ASSISTANT

## 2021-07-19 PROCEDURE — 86038 ANTINUCLEAR ANTIBODIES: CPT | Performed by: PHYSICIAN ASSISTANT

## 2021-07-19 PROCEDURE — 85027 COMPLETE CBC AUTOMATED: CPT | Performed by: PHYSICIAN ASSISTANT

## 2021-07-19 PROCEDURE — 80048 BASIC METABOLIC PNL TOTAL CA: CPT | Performed by: PHYSICIAN ASSISTANT

## 2021-07-19 PROCEDURE — 36415 COLL VENOUS BLD VENIPUNCTURE: CPT | Performed by: PHYSICIAN ASSISTANT

## 2021-07-19 PROCEDURE — 84481 FREE ASSAY (FT-3): CPT | Performed by: PHYSICIAN ASSISTANT

## 2021-07-19 ASSESSMENT — PAIN SCALES - GENERAL: PAINLEVEL: NO PAIN (0)

## 2021-07-19 NOTE — PROGRESS NOTES
"    Assessment & Plan     Ankle instability, right  Congenital pes planus of both feet  Symptoms of ankle instability - suggest she see ortho for further evaluation. She agrees with plan.   - Orthopedic  Referral; Future    Multiple joint pain  Ongoing joint issues, rule out underlying issue.   - ESR: Erythrocyte sedimentation rate; Future  - CRP, inflammation; Future  - Anti Nuclear Jackeline IgG by IFA with Reflex; Future  - Rheumatoid factor; Future  - ESR: Erythrocyte sedimentation rate  - CRP, inflammation  - Anti Nuclear Jackeline IgG by IFA with Reflex  - Rheumatoid factor    Weight gain  Screen for thyroid.   - TSH; Future  - T4, free; Future  - T3, Free; Future  - CBC with platelets; Future  - Basic metabolic panel  (Ca, Cl, CO2, Creat, Gluc, K, Na, BUN); Future  - TSH  - T4, free  - T3, Free  - CBC with platelets  - Basic metabolic panel  (Ca, Cl, CO2, Creat, Gluc, K, Na, BUN)    BMI:   Estimated body mass index is 34.71 kg/m  as calculated from the following:    Height as of 9/17/20: 1.645 m (5' 4.76\").    Weight as of this encounter: 93.9 kg (207 lb 0.8 oz).   Weight management plan: Discussed healthy diet and exercise guidelines        Return in about 3 months (around 10/19/2021) for Routine preventive.    Leatha Moss PA-C  [facility] RAD Fajardo is a 36 year old who presents for the following health issues     HPI     Patient is here today requesting labs to test thyroid function. She has had symptoms that she is concerned may be related to thyroid function. Weight gain, fatigue, plantar fasciitis, carpal tunnel, cold intolerance. If she is not clean eating her weight goes up rapidly.   Issues with joint pain, dry skin in ears and on scalp. No family history of thyroid issues..     Right ankle instability. Has been going on for a number of years. Saw a podiatrist - was given custom orthotics. Has congenital pes planus. Ankle seems to always be uncomfortable, gives out " at times. Doesn't swell. Can't recall a previous injury to the ankle.      Review of Systems   Constitutional, HEENT, cardiovascular, pulmonary, gi and gu systems are negative, except as otherwise noted.      Objective    /76 (BP Location: Right arm, Patient Position: Chair, Cuff Size: Adult Regular)   Pulse 93   Temp 98.9  F (37.2  C) (Oral)   Wt 93.9 kg (207 lb 0.8 oz)   SpO2 98%   BMI 34.71 kg/m    Body mass index is 34.71 kg/m .  Physical Exam   GENERAL: healthy, alert and no distress  NECK: no adenopathy, no asymmetry, masses, or scars and thyroid normal to palpation  RESP: lungs clear to auscultation - no rales, rhonchi or wheezes  CV: regular rate and rhythm, normal S1 S2, no S3 or S4, no murmur, click or rub, no peripheral edema and peripheral pulses strong  ABDOMEN: soft, nontender, no hepatosplenomegaly, no masses and bowel sounds normal  MS: no gross musculoskeletal defects noted, no edema

## 2021-07-20 LAB — RHEUMATOID FACT SER NEPH-ACNC: <7 IU/ML

## 2021-07-21 DIAGNOSIS — M25.571 PAIN IN JOINT, ANKLE AND FOOT, RIGHT: Primary | ICD-10-CM

## 2021-07-21 NOTE — TELEPHONE ENCOUNTER
RECORDS RECEIVED FROM: Ankle instability, right/no imaging/Leatha Moss PA-C in  FAMILY PRACTICE/P1/orthocn   DATE RECEIVED: Aug 24, 2021     NOTES STATUS DETAILS   OFFICE NOTE from referring provider Internal  Leatha Moss PA-C   OFFICE NOTE from other specialist N/A    DISCHARGE SUMMARY from hospital N/A    DISCHARGE REPORT from the ER N/A    OPERATIVE REPORT N/A    MEDICATION LIST Internal    IMPLANT RECORD/STICKER N/A    LABS     CBC/DIFF N/A    CULTURES N/A    INJECTIONS DONE IN RADIOLOGY N/A    MRI N/A    CT SCAN N/A    XRAYS (IMAGES & REPORTS) Internal    TUMOR     PATHOLOGY  Slides & report N/A      07/21/21   3:27 PM  IMAGING FROM 2016 ON FOOT AND ALSO ORDERED SAME DAY  COMPLETE  Kaya Aguilar, CMA

## 2021-07-22 DIAGNOSIS — M25.50 MULTIPLE JOINT PAIN: ICD-10-CM

## 2021-07-22 DIAGNOSIS — R76.8 POSITIVE ANA (ANTINUCLEAR ANTIBODY): Primary | ICD-10-CM

## 2021-07-22 LAB
ANA PAT SER IF-IMP: ABNORMAL
ANA SER QL IF: POSITIVE
ANA TITR SER IF: ABNORMAL {TITER}

## 2021-08-22 ASSESSMENT — ENCOUNTER SYMPTOMS
JOINT SWELLING: 0
BACK PAIN: 0
MUSCLE CRAMPS: 0
STIFFNESS: 0
MUSCLE WEAKNESS: 0
ARTHRALGIAS: 0
MYALGIAS: 0
NECK PAIN: 0

## 2021-08-24 ENCOUNTER — OFFICE VISIT (OUTPATIENT)
Dept: ORTHOPEDICS | Facility: CLINIC | Age: 36
End: 2021-08-24
Payer: COMMERCIAL

## 2021-08-24 ENCOUNTER — PRE VISIT (OUTPATIENT)
Dept: ORTHOPEDICS | Facility: CLINIC | Age: 36
End: 2021-08-24

## 2021-08-24 ENCOUNTER — ANCILLARY PROCEDURE (OUTPATIENT)
Dept: GENERAL RADIOLOGY | Facility: CLINIC | Age: 36
End: 2021-08-24
Attending: ORTHOPAEDIC SURGERY
Payer: COMMERCIAL

## 2021-08-24 VITALS — BODY MASS INDEX: 34.49 KG/M2 | WEIGHT: 207 LBS | HEIGHT: 65 IN

## 2021-08-24 DIAGNOSIS — M25.571 PAIN IN JOINT, ANKLE AND FOOT, RIGHT: ICD-10-CM

## 2021-08-24 DIAGNOSIS — M25.371 ANKLE INSTABILITY, RIGHT: ICD-10-CM

## 2021-08-24 PROCEDURE — 99203 OFFICE O/P NEW LOW 30 MIN: CPT | Performed by: ORTHOPAEDIC SURGERY

## 2021-08-24 PROCEDURE — 73610 X-RAY EXAM OF ANKLE: CPT | Mod: RT | Performed by: RADIOLOGY

## 2021-08-24 ASSESSMENT — MIFFLIN-ST. JEOR: SCORE: 1619.83

## 2021-08-24 NOTE — NURSING NOTE
"Reason For Visit:   Chief Complaint   Patient presents with     Consult     Right ankle instability       Ht 1.651 m (5' 5\")   Wt 93.9 kg (207 lb)   BMI 34.45 kg/m      Pain Assessment  Patient Currently in Pain: Yes  0-10 Pain Scale:  (dull pain in right foot)    Anjel Tanner, EMT    "

## 2021-08-24 NOTE — PROGRESS NOTES
CHIEF COMPLAINT:  Right foot and ankle pain.    HISTORY OF PRESENT ILLNESS:  Mrs. Kyle is a 36-year-old female who presents today for evaluation of her right foot and ankle.  The patient reports to have a long history of pain and discomfort without any obvious history of trauma.  She will describe the pain along the ankle region, especially laterally as well as some radiation into the foot, both dorsally and plantarly and towards the end of the day she also reports to have some pain along the lower leg.  She also reports to have some associated numbness and burning with it.    The patient carries a history of multiple custom orthotics as well as ankle braces, but apparently nothing has really improved her pain or discomfort.    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    The patient reports to have some left foot pain, but clearly denies to have the similar symptoms that she reports to the right.  She is also very clear about the right lower leg that the pain seems to be fairly deep in nature.    PAST MEDICAL HISTORY:  Significant for high cholesterol.    PAST SURGICAL HISTORY:  Reviewed today.    DRUG ALLERGIES:  None.    CURRENT MEDICATIONS:       1. Please refer to encounter form.    PHYSICAL EXAMINATION:  On today's visit, she presents as a pleasant female, in no apparent distress with a height of 5 feet 5 and a weight of 207 pounds.  Denies to have any constitutional symptoms.    On today's visit, she presents with full range of motion of the right ankle, hindfoot and midfoot joints.  CMS intact.  Skin is intact.  Presents with a correctable flatfoot deformity.  There is slight increased anterior drawer to the right ankle.  There is no pain with palpation of the lateral ligament complex, peroneal tendons, posterior tibialis tendon of the ankle joint, talonavicular joint and subtalar joints.    IMAGING:  Plain x-rays of the right ankle were  reviewed today, which were grossly nondiagnostic.  The patient presents with some slight collapse of the medial column, which would be expected.  Otherwise, there are no acute findings.  I could not visualize any type of osteochondral defect across the ankle joint.  There is erosion across the dorsal aspect of the navicular bone, but is somewhat nonspecific.    ASSESSMENT:  Right ankle pain, possible radiating pattern.    PLAN:  Discussed with the patient that I have a strong suspicion that her pain is coming from the lumbar spine and a pinched nerve.  However, the patient was very adamant that the pain is coming more from the ankle joint.  We are going to proceed with an MRI of the ankle joint.  If this is unremarkable, we are going to pursue an MRI of the lumbar spine to rule out lumbar radiculopathy.  If that MRI is normal, most likely we will refer the patient to a pain expert.    All questions were answered.  The patient was pleased with the discussion.  She has no restrictions.  The patient will follow up accordingly.    TT:  30 minutes.  CT:  20 minutes.

## 2021-08-24 NOTE — LETTER
8/24/2021         RE: Tana Kyle  [address] [facility] [id number]        Dear Colleague,    Thank you for referring your patient, Tana Kyle, to the [facility] [place]. Please see a copy of my visit note below.    CHIEF COMPLAINT:  Right foot and ankle pain.    HISTORY OF PRESENT ILLNESS:  Mrs. Kyle is a 36-year-old female who presents today for evaluation of her right foot and ankle.  The patient reports to have a long history of pain and discomfort without any obvious history of trauma.  She will describe the pain along the ankle region, especially laterally as well as some radiation into the foot, both dorsally and plantarly and towards the end of the day she also reports to have some pain along the lower leg.  She also reports to have some associated numbness and burning with it.    The patient carries a history of multiple custom orthotics as well as ankle braces, but apparently nothing has really improved her pain or discomfort.    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    The patient reports to have some left foot pain, but clearly denies to have the similar symptoms that she reports to the right.  She is also very clear about the right lower leg that the pain seems to be fairly deep in nature.    PAST MEDICAL HISTORY:  Significant for high cholesterol.    PAST SURGICAL HISTORY:  Reviewed today.    DRUG ALLERGIES:  None.    CURRENT MEDICATIONS:       1. Please refer to encounter form.    PHYSICAL EXAMINATION:  On today's visit, she presents as a pleasant female, in no apparent distress with a height of 5 feet 5 and a weight of 207 pounds.  Denies to have any constitutional symptoms.    On today's visit, she presents with full range of motion of the right ankle, hindfoot and midfoot joints.  CMS intact.  Skin is intact.  Presents with a correctable flatfoot deformity.  There is slight  increased anterior drawer to the right ankle.  There is no pain with palpation of the lateral ligament complex, peroneal tendons, posterior tibialis tendon of the ankle joint, talonavicular joint and subtalar joints.    IMAGING:  Plain x-rays of the right ankle were reviewed today, which were grossly nondiagnostic.  The patient presents with some slight collapse of the medial column, which would be expected.  Otherwise, there are no acute findings.  I could not visualize any type of osteochondral defect across the ankle joint.  There is erosion across the dorsal aspect of the navicular bone, but is somewhat nonspecific.    ASSESSMENT:  Right ankle pain, possible radiating pattern.    PLAN:  Discussed with the patient that I have a strong suspicion that her pain is coming from the lumbar spine and a pinched nerve.  However, the patient was very adamant that the pain is coming more from the ankle joint.  We are going to proceed with an MRI of the ankle joint.  If this is unremarkable, we are going to pursue an MRI of the lumbar spine to rule out lumbar radiculopathy.  If that MRI is normal, most likely we will refer the patient to a pain expert.    All questions were answered.  The patient was pleased with the discussion.  She has no restrictions.  The patient will follow up accordingly.    TT:  30 minutes.  CT:  20 minutes.      Addy Mercedes MD

## 2021-09-07 ENCOUNTER — ANCILLARY PROCEDURE (OUTPATIENT)
Dept: MRI IMAGING | Facility: CLINIC | Age: 36
End: 2021-09-07
Attending: ORTHOPAEDIC SURGERY
Payer: COMMERCIAL

## 2021-09-07 DIAGNOSIS — M25.371 ANKLE INSTABILITY, RIGHT: ICD-10-CM

## 2021-09-07 PROCEDURE — 73721 MRI JNT OF LWR EXTRE W/O DYE: CPT | Mod: RT | Performed by: RADIOLOGY

## 2021-09-08 DIAGNOSIS — M25.571 PAIN IN JOINT, ANKLE AND FOOT, RIGHT: Primary | ICD-10-CM

## 2021-09-08 PROCEDURE — 99207 PR INJECTION SINGLE TENDON SHEATH/LIGAMENT: CPT | Performed by: ORTHOPAEDIC SURGERY

## 2021-09-26 ENCOUNTER — HEALTH MAINTENANCE LETTER (OUTPATIENT)
Age: 36
End: 2021-09-26

## 2021-10-11 NOTE — NURSING NOTE
"Tana Kyle's goals for this visit include:   Chief Complaint   Patient presents with     Consult     discuss multiple joint pain and positive CHRISTI, labs completed 7/19/2021     Referral     referred by: Leatha Moss PA-C       PCP: Martina Cohen    Referring Provider:  Leatha Moss PA-C  [address] [facility] [place] [address]      Initial /81 (BP Location: Left arm, Patient Position: Sitting)   Pulse 81   Temp 98.3  F (36.8  C) (Oral)   Wt 94.3 kg (207 lb 12.8 oz)   LMP  (LMP Unknown)   SpO2 99%   Breastfeeding No   BMI 34.58 kg/m   Estimated body mass index is 34.58 kg/m  as calculated from the following:    Height as of 8/24/21: 1.651 m (5' 5\").    Weight as of this encounter: 94.3 kg (207 lb 12.8 oz).    Medication Reconciliation: complete    Do you need any medication refills at today's visit? none      PEPITO Casper [facility]   "

## 2021-10-19 NOTE — PROGRESS NOTES
RHEUMATOLOGY INITIAL CONSULT NOTE    Chief Complaint:    Chief Complaint   Patient presents with     Consult     discuss multiple joint pain and positive CHRISTI, labs completed 7/19/2021     Referral     referred by: Leatha Moss PA-C       Reason for consult: Dr. Leatha Moss from  has requested consultation for this patient for polyarthralgia    History of Present Illness:    Tana Kyle is a 36 year old female with pmhx eczema, migraines, is referred to rheumatology clinic for polyarthralgia. She reports having hx of pes planus for many years and has seen podiatry several times. She has tried inserts without much relief. She reports hx of b/l foot pain for many years. More recently, she has been having worsening pain/burning sensation over R ankle. She feels her pain is worse with certain foods. Pain is present regardless of standing, sitting, or walking. She has been unable to go for walks due to pain. Additionally, she has also has hx of some hand/wrist pain. She was seen by orthopedics for this was told she has tendonitis. 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 She has quit the job since ~2 years ago. Her pain has improved since quitting her job but she still experiences pain with making a tight fist. She denies AM pain over hands but occasionally gets b/l foot pain in AM. Denies AM stiffness. B/l foot pain worsens as the day progresses. She denies any persistent joint swelling. She does not take anything for pain.     Denies fevers, chills, weight loss, night sweats, vision changes, eye pain/redness, +dry eyes -attributes it to work environment which has constant air blowing, denies dry mouth, oral/nasal ulcers, hair loss/thinning, rash, photosensitivity, raynaud's, cough, SOB, pleurisy, chest pain, edema, heartburn, difficulty swallowing, abdominal pain, diarrhea, hematochezia, melena, dysuria, back pain, enthesitis, dactylitis, +occasional numbness and tingling over  feet. No hx of IBD. No hx of blood clots. Denies hx of miscarriages.    Pertinent labs and imaging: (per chart review in [facility] and care everywhere)    Labs:   7/19/21: +CHRISTI 1:160 nuclear dots, negative RF, ESR, CRP, TSH, BMP, CBC w/o diff    Past Medical History:    Past Medical History:   Diagnosis Date     Eczema      Migraines     Immitrex     Recurrent acute otitis media      Past Surgical History: History reviewed. No pertinent surgical history.    Family History:   Denies family hx of SLE, Sjogren's syndrome, scleroderma, PsA, ankylosing spondylitis, psoriasis, vasculitis, gout, pseudogout    Maternal grandmother: RA    Social History:     Alcohol use: none  Tobacco use: never  Occupational hx: works as a huc at the [employer]  Sexual history: not currently sexually active, contraception use: IUD    No Known Allergies   Immunization History   Administered Date(s) Administered     COVID-19,PF,Pfizer 01/15/2021, 02/03/2021     DT (PEDS <7y) 07/28/1995     HEPA 09/10/2001     HepB 08/07/1995, 08/26/1997, 08/14/2000     Historical DTP/aP 1983, 1983, 1983, 11/05/1984, 08/30/1988     Influenza Intranasal Vaccine 4 valent (FluMist) 10/07/2020, 10/21/2021     Influenza Vaccine IM > 6 months Valent IIV4 (Alfuria,Fluzone) 02/24/2016     MMR 05/14/1984, 08/07/1995     OPV, trivalent, live 1983, 1983, 1983, 11/05/1984     TD (ADULT, 7+) 07/27/1995, 09/17/2020     TDAP Vaccine (Adacel) 05/25/2010     Medications:  Current Outpatient Medications   Medication Sig Dispense Refill     levonorgestrel (MIRENA, 52 MG,) 20 MCG/24HR IUD 1 each (20 mcg) by Intrauterine route once for 1 dose 1 each 0       PHYSICAL EXAMINATION:   Vital signs:  /81 (BP Location: Left arm, Patient Position: Sitting)   Pulse 81   Temp 98.3  F (36.8  C) (Oral)   Wt 94.3 kg (207 lb 12.8 oz)   LMP  (LMP Unknown)   SpO2 99%   Breastfeeding No   BMI 34.58 kg/m      MSK: no synovitis of joints, no joint  effusions/warmth/erythema, no joint tenderness, no dactylitis, no enthesitis, ROM intact in all joints, able to make a fist b/l, no nail pitting, no nailfold capillary abnormalities, no SI joint tenderness, able to touch toes without flexing knees  Skin: no rashes  HEENT: MMM, no oral ulcers/lesions, no alopecia  CV: RRR  Pulm: CTAB, non-labored respirations, no c/r/w  Neuro: A&O x3, no focal deficits    Labs:      I have reviewed all pertinent investigations including labs, including outside records if relevant    RF/CCP  Recent Labs   Lab Test 07/19/21  1545   RHF <7     CHRISTI  Recent Labs   Lab Test 07/19/21  1545   WHITLEY Positive*   ANAP1 Nuclear dots   ANAT1 1:160     RNP/Sm/SSA/SSB  Recent Labs   Lab Test 06/23/17  1012   TREPAB Negative     CBC  Recent Labs   Lab Test 07/19/21  1545 09/17/20  0726   WBC 8.9 5.6   RBC 5.06 5.06   HGB 13.8 13.7   HCT 41.9 42.2   MCV 83 83   RDW 12.6 12.1    234   MCH 27.3 27.1   MCHC 32.9 32.5     CMP  Recent Labs   Lab Test 07/19/21  1545 09/17/20  0726 02/24/16  0937    139  --    POTASSIUM 4.1 4.2  --    CHLORIDE 105 106  --    CO2 28 27  --    ANIONGAP 3 6  --    GLC 79 81 78   BUN 13 12  --    CR 0.74 0.79  --    GFRESTIMATED >90 >90  --    GFRESTBLACK  --  >90  --    DAKSHA 9.3 9.0  --      HgA1c  Recent Labs   Lab Test 09/17/20  0726   A1C 5.4     Calcium/VitaminD  Recent Labs   Lab Test 07/19/21  1545 09/17/20  0726   DAKSHA 9.3 9.0     ESR/CRP  Recent Labs   Lab Test 07/19/21  1545   SED 19   CRP <2.9     TSH/T4  Recent Labs   Lab Test 07/19/21  1545   TSH 2.21   T4 1.29     Lipid Panel  Recent Labs   Lab Test 09/17/20  0726 02/24/16  0937   CHOL 121 139   TRIG 63 34   HDL 47* 63   LDL 61 69   NHDL 74 76     Hepatitis B  Recent Labs   Lab Test 06/23/17  1012   AUSAB >1000.00  Reactive, Patient is considered to be immune to infection with hepatitis B when   the value is greater than or equal to 12.0 m[IU]/mL.  *   HEPBANG Nonreactive     Hepatitis C  Recent Labs    Lab Test 06/23/17  1012   HCVAB Nonreactive   Assay performance characteristics have not been established for newborns,   infants, and children       HIV Screening  Recent Labs   Lab Test 06/23/17  1012   HIAGAB Nonreactive   HIV-1 p24 Ag & HIV-1/HIV-2 Ab Not Detected       Imaging:     I have reviewed all pertinent investigations including imaging, including outside records if relevant    MRI R ankle (9/7/21)  IMPRESSION:  1. Bone marrow edema with osteochondral impaction injury involving the  right ankle navicular with subchondral bone irregularity, at its  articulation with the middle cuneiform, for example axial series 6001,  image 23 and sagittal series 4001, image 17.  2. The tendinous and ligamentous structures about the right ankle are  intact.  3. Tiny focal area of thickening of the central cord of the plantar  fascia, located approximately 2.4 cm distal from the origin on the  plantar calcaneus.    XR R ankle (8/24/21)  Impression:  1. No acute osseous abnormality.  2. No substantial degenerative change.    Assessment / Plan:    Tana Kyle is a 36 year old female with pmhx eczema, migraines, is referred to rheumatology clinic for polyarthralgia mostly involving b/l feet and ankles (R>L). Any prior notes, outside records, laboratory results, and imaging studies were reviewed if relevant. Pertinent work-up thus far includes  +CHRISTI 1:160 nuclear dots, negative RF, ESR, CRP, TSH, BMP, CBC w/o diff. Pain is worse with activity. No AM stiffness, no persistent joint swelling/warmth/erythema. No synovitis on exam today. No synovitis or ankle joint effusion seen on MRI R ankle. No nail pitting, psoriatic lesions, no hx of inflammatory eye disease, no IBD. History and physical exam are not consistent with inflammatory arthritis. We discussed that a positive CHRISTI can be seen in a variety of different diseases and syndrome.  The anti-nuclear antibody is a screening test for auto-immune disease.  Depending on the  level or titer, it may be seen in autoimmune diseases such as SLE, sjogren's, scleroderma but could also be seen in auto-immune hepatitis, hypothyroidism and various other disease.  A positive CHRISTI does not give one a conclusive diagnosis of SLE as it may be a false positive and may not be significant as well.  Further w/u for auto-immune disease can be considered based on clinical symptoms and other laboratory findings.  In the absence of rashes, photosensitivity, pleurisy, mucocutaneous lesions, alopecia, inflammatory joint pain, raynaud's, my suspicion for an underlying autoimmune CTD is low. We discussed obtaining specific autoantibodies for further diagnostic evaluation.     1) +CHRISTI 1:160 speckled  -check SSA, SSB, Sm, RNP, dsDNA, C3, C4, CCP, UA, UPC  -obtain plain films of b/l hands and feet    2) B/l foot pain (R >L)  -likely a component of pes planus and plantar fasciitis  -recommend continued follow-up with podiatry  -will evaluate for inflammatory arthritis as above, though low clinical suspicion    Ms. Kyle verbalized agreement with and understanding of the rationale for the diagnosis and treatment plan.  All questions were answered to best of my ability and the patient's satisfaction. Ms. Kyle was advised to contact the clinic with any questions that may arise after the clinic visit.        Chart documentation done in part with Dragon Voice recognition Software. Although reviewed after completion, some word and grammatical error may remain.      RTC pending test results    Renuka Allen MD

## 2021-10-21 ENCOUNTER — ANCILLARY PROCEDURE (OUTPATIENT)
Dept: GENERAL RADIOLOGY | Facility: CLINIC | Age: 36
End: 2021-10-21
Attending: STUDENT IN AN ORGANIZED HEALTH CARE EDUCATION/TRAINING PROGRAM
Payer: COMMERCIAL

## 2021-10-21 ENCOUNTER — OFFICE VISIT (OUTPATIENT)
Dept: RHEUMATOLOGY | Facility: CLINIC | Age: 36
End: 2021-10-21
Attending: PHYSICIAN ASSISTANT
Payer: COMMERCIAL

## 2021-10-21 VITALS
BODY MASS INDEX: 34.58 KG/M2 | WEIGHT: 207.8 LBS | HEART RATE: 81 BPM | SYSTOLIC BLOOD PRESSURE: 124 MMHG | DIASTOLIC BLOOD PRESSURE: 81 MMHG | TEMPERATURE: 98.3 F | OXYGEN SATURATION: 99 %

## 2021-10-21 DIAGNOSIS — R76.8 POSITIVE ANA (ANTINUCLEAR ANTIBODY): ICD-10-CM

## 2021-10-21 DIAGNOSIS — M25.50 MULTIPLE JOINT PAIN: ICD-10-CM

## 2021-10-21 LAB
ALBUMIN UR-MCNC: NEGATIVE MG/DL
APPEARANCE UR: CLEAR
BACTERIA #/AREA URNS HPF: ABNORMAL /HPF
BILIRUB UR QL STRIP: NEGATIVE
COLOR UR AUTO: NORMAL
CREAT UR-MCNC: 33 MG/DL
GLUCOSE UR STRIP-MCNC: NEGATIVE MG/DL
HGB UR QL STRIP: NEGATIVE
KETONES UR STRIP-MCNC: NEGATIVE MG/DL
LEUKOCYTE ESTERASE UR QL STRIP: NEGATIVE
NITRATE UR QL: NEGATIVE
PH UR STRIP: 6.5 [PH] (ref 5–7)
PROT UR-MCNC: <0.05 G/L
PROT/CREAT 24H UR: NORMAL MG/G{CREAT}
RBC #/AREA URNS AUTO: ABNORMAL /HPF
SP GR UR STRIP: 1.01 (ref 1–1.03)
SQUAMOUS #/AREA URNS AUTO: ABNORMAL /LPF
UROBILINOGEN UR STRIP-MCNC: NORMAL MG/DL
WBC #/AREA URNS AUTO: ABNORMAL /HPF

## 2021-10-21 PROCEDURE — 73630 X-RAY EXAM OF FOOT: CPT | Mod: GC | Performed by: RADIOLOGY

## 2021-10-21 PROCEDURE — 81001 URINALYSIS AUTO W/SCOPE: CPT | Performed by: STUDENT IN AN ORGANIZED HEALTH CARE EDUCATION/TRAINING PROGRAM

## 2021-10-21 PROCEDURE — 86225 DNA ANTIBODY NATIVE: CPT | Performed by: STUDENT IN AN ORGANIZED HEALTH CARE EDUCATION/TRAINING PROGRAM

## 2021-10-21 PROCEDURE — 84156 ASSAY OF PROTEIN URINE: CPT | Performed by: STUDENT IN AN ORGANIZED HEALTH CARE EDUCATION/TRAINING PROGRAM

## 2021-10-21 PROCEDURE — 36415 COLL VENOUS BLD VENIPUNCTURE: CPT | Performed by: STUDENT IN AN ORGANIZED HEALTH CARE EDUCATION/TRAINING PROGRAM

## 2021-10-21 PROCEDURE — 86200 CCP ANTIBODY: CPT | Performed by: STUDENT IN AN ORGANIZED HEALTH CARE EDUCATION/TRAINING PROGRAM

## 2021-10-21 PROCEDURE — 86160 COMPLEMENT ANTIGEN: CPT | Performed by: STUDENT IN AN ORGANIZED HEALTH CARE EDUCATION/TRAINING PROGRAM

## 2021-10-21 PROCEDURE — 73130 X-RAY EXAM OF HAND: CPT | Mod: GC | Performed by: RADIOLOGY

## 2021-10-21 PROCEDURE — 99204 OFFICE O/P NEW MOD 45 MIN: CPT | Performed by: STUDENT IN AN ORGANIZED HEALTH CARE EDUCATION/TRAINING PROGRAM

## 2021-10-21 PROCEDURE — 86235 NUCLEAR ANTIGEN ANTIBODY: CPT | Performed by: STUDENT IN AN ORGANIZED HEALTH CARE EDUCATION/TRAINING PROGRAM

## 2021-10-21 NOTE — PATIENT INSTRUCTIONS
-Please get labs and x-rays today  -I will be in touch once all test results are back  -Follow-up pending test results

## 2021-10-22 LAB
C3 SERPL-MCNC: 149 MG/DL (ref 81–157)
C4 SERPL-MCNC: 23 MG/DL (ref 13–39)
CCP AB SER IA-ACNC: 1.2 U/ML
DSDNA AB SER-ACNC: 2.1 IU/ML
ENA SM IGG SER IA-ACNC: 2.1 U/ML
ENA SM IGG SER IA-ACNC: NEGATIVE
ENA SS-A AB SER IA-ACNC: 1.3 U/ML
ENA SS-A AB SER IA-ACNC: NEGATIVE
ENA SS-B IGG SER IA-ACNC: <0.6 U/ML
ENA SS-B IGG SER IA-ACNC: NEGATIVE
U1 SNRNP IGG SER IA-ACNC: 1.5 U/ML
U1 SNRNP IGG SER IA-ACNC: NEGATIVE

## 2021-10-22 NOTE — RESULT ENCOUNTER NOTE
Dear Tana,     Good news! Antibodies for autoimmune connective tissue disease such as lupus, Sjogren's syndrome, and rheumatoid arthritis are negative. Urine did not show any abnormal protein or blood. X-ray of hands and feet did not show any joint damage. These results are reassuring from my perspective. No further work-up needed at this time. If you develop any persistent joint swelling, let me know.    Please let us know if you have any questions or concerns.    Regards,  Renuka Allen MD

## 2022-05-03 NOTE — TELEPHONE ENCOUNTER
Goals:    #1 start using stationary bike at least 3 times per week starting 10 minutes and building up to 20-30 minutes per day.     Carbohydrates:  30-45 g at meals    Snacks:  0- 15 g        Routed to covering providers.    Terri King,RN,BSN  [facility]

## 2022-05-08 ENCOUNTER — HEALTH MAINTENANCE LETTER (OUTPATIENT)
Age: 37
End: 2022-05-08

## 2022-06-10 ENCOUNTER — OFFICE VISIT (OUTPATIENT)
Dept: FAMILY MEDICINE | Facility: CLINIC | Age: 37
End: 2022-06-10
Payer: COMMERCIAL

## 2022-06-10 VITALS
OXYGEN SATURATION: 99 % | WEIGHT: 200.05 LBS | HEART RATE: 98 BPM | TEMPERATURE: 98.7 F | DIASTOLIC BLOOD PRESSURE: 80 MMHG | HEIGHT: 65 IN | BODY MASS INDEX: 33.33 KG/M2 | SYSTOLIC BLOOD PRESSURE: 120 MMHG

## 2022-06-10 DIAGNOSIS — H69.93 DYSFUNCTION OF BOTH EUSTACHIAN TUBES: ICD-10-CM

## 2022-06-10 DIAGNOSIS — H60.393 INFECTIVE OTITIS EXTERNA, BILATERAL: ICD-10-CM

## 2022-06-10 DIAGNOSIS — Z00.00 ROUTINE GENERAL MEDICAL EXAMINATION AT A HEALTH CARE FACILITY: Primary | ICD-10-CM

## 2022-06-10 DIAGNOSIS — Z11.3 SCREEN FOR STD (SEXUALLY TRANSMITTED DISEASE): ICD-10-CM

## 2022-06-10 LAB
HCV AB SERPL QL IA: NONREACTIVE
HIV 1+2 AB+HIV1 P24 AG SERPL QL IA: NONREACTIVE
T PALLIDUM AB SER QL: NONREACTIVE

## 2022-06-10 PROCEDURE — 87389 HIV-1 AG W/HIV-1&-2 AB AG IA: CPT | Performed by: PHYSICIAN ASSISTANT

## 2022-06-10 PROCEDURE — 86780 TREPONEMA PALLIDUM: CPT | Performed by: PHYSICIAN ASSISTANT

## 2022-06-10 PROCEDURE — 86696 HERPES SIMPLEX TYPE 2 TEST: CPT | Performed by: PHYSICIAN ASSISTANT

## 2022-06-10 PROCEDURE — 87491 CHLMYD TRACH DNA AMP PROBE: CPT | Performed by: PHYSICIAN ASSISTANT

## 2022-06-10 PROCEDURE — 99395 PREV VISIT EST AGE 18-39: CPT | Performed by: PHYSICIAN ASSISTANT

## 2022-06-10 PROCEDURE — 87591 N.GONORRHOEAE DNA AMP PROB: CPT | Performed by: PHYSICIAN ASSISTANT

## 2022-06-10 PROCEDURE — 86695 HERPES SIMPLEX TYPE 1 TEST: CPT | Performed by: PHYSICIAN ASSISTANT

## 2022-06-10 PROCEDURE — 99213 OFFICE O/P EST LOW 20 MIN: CPT | Mod: 25 | Performed by: PHYSICIAN ASSISTANT

## 2022-06-10 PROCEDURE — 36415 COLL VENOUS BLD VENIPUNCTURE: CPT | Performed by: PHYSICIAN ASSISTANT

## 2022-06-10 PROCEDURE — 86803 HEPATITIS C AB TEST: CPT | Performed by: PHYSICIAN ASSISTANT

## 2022-06-10 RX ORDER — FLUTICASONE PROPIONATE 50 MCG
1 SPRAY, SUSPENSION (ML) NASAL DAILY
Qty: 16 G | Refills: 1 | Status: SHIPPED | OUTPATIENT
Start: 2022-06-10

## 2022-06-10 RX ORDER — NEOMYCIN SULFATE, POLYMYXIN B SULFATE, HYDROCORTISONE 3.5; 10000; 1 MG/ML; [USP'U]/ML; MG/ML
3 SOLUTION/ DROPS AURICULAR (OTIC) 4 TIMES DAILY
Qty: 10 ML | Refills: 0 | Status: SHIPPED | OUTPATIENT
Start: 2022-06-10

## 2022-06-10 ASSESSMENT — PAIN SCALES - GENERAL: PAINLEVEL: NO PAIN (0)

## 2022-06-10 NOTE — PROGRESS NOTES
SUBJECTIVE:   CC: Tana yKle is an 37 year old woman who presents for preventive health visit.     Patient has been advised of split billing requirements and indicates understanding: Yes  Healthy Habits:     Taking medications regularly:  0    PHQ-2 Total Score: 0  History of Present Illness       Reason for visit:  Physical and ears itch    She eats 2-3 servings of fruits and vegetables daily.She consumes 0 sweetened beverage(s) daily.She exercises with enough effort to increase her heart rate 30 to 60 minutes per day.  She exercises with enough effort to increase her heart rate 4 days per week.   She is taking medications regularly.        Having some ear itching. A few months ago had some ear pain with flying. R>L. Has been taking claritin (dissolveable) - not sure if it is helping or not.     Recently has new partner - woman. This person has herpes. Questions about testing.             Today's PHQ-2 Score:   PHQ-2 ( 1999 Pfizer) 6/9/2022   Q1: Little interest or pleasure in doing things 0   Q2: Feeling down, depressed or hopeless 0   PHQ-2 Score 0   PHQ-2 Total Score (12-17 Years)- Positive if 3 or more points; Administer PHQ-A if positive -   Q1: Little interest or pleasure in doing things Not at all   Q2: Feeling down, depressed or hopeless Not at all   PHQ-2 Score 0       Abuse: Current or Past (Physical, Sexual or Emotional) - No  Do you feel safe in your environment? Yes    Have you ever done Advance Care Planning? (For example, a Health Directive, POLST, or a discussion with a medical provider or your loved ones about your wishes): No, advance care planning information given to patient to review.  Patient declined advance care planning discussion at this time.    Social History     Tobacco Use     Smoking status: Never Smoker     Smokeless tobacco: Never Used   Substance Use Topics     Alcohol use: No     If you drink alcohol do you typically have >3 drinks per day or >7 drinks per week? No    No  flowsheet data found.    Reviewed orders with patient.  Reviewed health maintenance and updated orders accordingly - Yes  BP Readings from Last 3 Encounters:   06/10/22 120/80   10/21/21 124/81   07/19/21 125/76    Wt Readings from Last 3 Encounters:   06/10/22 90.7 kg (200 lb 0.8 oz)   10/21/21 94.3 kg (207 lb 12.8 oz)   08/24/21 93.9 kg (207 lb)                    Breast Cancer Screening:    Breast CA Risk Assessment (FHS-7) 6/10/2022   Do you have a family history of breast, colon, or ovarian cancer? No / Unknown         Patient under 40 years of age: Routine Mammogram Screening not recommended.   Pertinent mammograms are reviewed under the imaging tab.    History of abnormal Pap smear: NO - age 30-65 PAP every 5 years with negative HPV co-testing recommended  PAP / HPV Latest Ref Rng & Units 9/17/2020 2/24/2016 5/25/2010   PAP (Historical) - NIL NIL NIL   HPV16 NEG:Negative Negative Negative -   HPV18 NEG:Negative Negative Negative -   HRHPV NEG:Negative Negative Negative -     Reviewed and updated as needed this visit by clinical staff   Tobacco  Allergies  Meds  Problems  Med Hx  Surg Hx  Fam Hx            Reviewed and updated as needed this visit by Provider   Tobacco  Allergies  Meds  Problems  Med Hx  Surg Hx  Fam Hx               Review of Systems  CONSTITUTIONAL: NEGATIVE for fever, chills, change in weight  INTEGUMENTARU/SKIN: NEGATIVE for worrisome rashes, moles or lesions  EYES: NEGATIVE for vision changes or irritation  ENT: NEGATIVE for ear, mouth and throat problems  RESP: NEGATIVE for significant cough or SOB  BREAST: NEGATIVE for masses, tenderness or discharge  CV: NEGATIVE for chest pain, palpitations or peripheral edema  GI: NEGATIVE for nausea, abdominal pain, heartburn, or change in bowel habits  : NEGATIVE for unusual urinary or vaginal symptoms. Periods are regular.  MUSCULOSKELETAL: NEGATIVE for significant arthralgias or myalgia  NEURO: NEGATIVE for weakness, dizziness or  "paresthesias  PSYCHIATRIC: NEGATIVE for changes in mood or affect     OBJECTIVE:   /80 (BP Location: Right arm, Patient Position: Chair, Cuff Size: Adult Regular)   Pulse 98   Temp 98.7  F (37.1  C) (Oral)   Ht 1.651 m (5' 5\")   Wt 90.7 kg (200 lb 0.8 oz)   SpO2 99%   BMI 33.29 kg/m    Physical Exam  GENERAL: healthy, alert and no distress  EYES: Eyes grossly normal to inspection, PERRL and conjunctivae and sclerae normal  HENT: ear canals and TM's normal, nose and mouth without ulcers or lesions  NECK: no adenopathy, no asymmetry, masses, or scars and thyroid normal to palpation  RESP: lungs clear to auscultation - no rales, rhonchi or wheezes  CV: regular rate and rhythm, normal S1 S2, no S3 or S4, no murmur, click or rub, no peripheral edema and peripheral pulses strong  ABDOMEN: soft, nontender, no hepatosplenomegaly, no masses and bowel sounds normal  MS: no gross musculoskeletal defects noted, no edema  SKIN: no suspicious lesions or rashes  NEURO: Normal strength and tone, mentation intact and speech normal  PSYCH: mentation appears normal, affect normal/bright    Diagnostic Test Results:  Labs reviewed in Epic    ASSESSMENT/PLAN:   1. Routine general medical examination at a health care facility  Well adult.     2. Infective otitis externa, bilateral  Likely after effects of allergy season. Will treat with cortisporin. I discussed with the patient risks and benefits of the new medication prescribed including potential side effects.  The patient had opportunity to ask questions and is comfortable with and interested in medications as prescribed.   - neomycin-polymyxin-hydrocortisone (CORTISPORIN) 3.5-11920-5 otic solution; Place 3 drops in ear(s) 4 times daily  Dispense: 10 mL; Refill: 0    3. Dysfunction of both eustachian tubes  Might help with symptoms. I discussed with the patient risks and benefits of the new medication prescribed including potential side effects.  The patient had opportunity " "to ask questions and is comfortable with and interested in medications as prescribed.   - fluticasone (FLONASE) 50 MCG/ACT nasal spray; Spray 1 spray into both nostrils daily  Dispense: 16 g; Refill: 1    4. Screen for STD (sexually transmitted disease)  Screen.  - HIV Antigen Antibody Combo; Future  - Treponema Abs w Reflex to RPR and Titer; Future  - NEISSERIA GONORRHOEA PCR; Future  - CHLAMYDIA TRACHOMATIS PCR; Future  - Hepatitis C Screen Reflex to HCV RNA Quant and Genotype; Future  - Herpes Simplex Virus 1 and 2 IgG; Future  - Herpes Simplex Virus 1 and 2 IgG  - Hepatitis C Screen Reflex to HCV RNA Quant and Genotype  - CHLAMYDIA TRACHOMATIS PCR  - NEISSERIA GONORRHOEA PCR  - Treponema Abs w Reflex to RPR and Titer  - HIV Antigen Antibody Combo      Patient has been advised of split billing requirements and indicates understanding: Yes    COUNSELING:  Special attention given to:        Regular exercise       Healthy diet/nutrition       Safe sex practices/STD prevention    Estimated body mass index is 33.29 kg/m  as calculated from the following:    Height as of this encounter: 1.651 m (5' 5\").    Weight as of this encounter: 90.7 kg (200 lb 0.8 oz).    Weight management plan: Discussed healthy diet and exercise guidelines    She reports that she has never smoked. She has never used smokeless tobacco.      Counseling Resources:  ATP IV Guidelines  Pooled Cohorts Equation Calculator  Breast Cancer Risk Calculator  BRCA-Related Cancer Risk Assessment: FHS-7 Tool  FRAX Risk Assessment  ICSI Preventive Guidelines  Dietary Guidelines for Americans, 2010  USDA's MyPlate  ASA Prophylaxis  Lung CA Screening    NIKHIL [facility]  "

## 2022-06-10 NOTE — PROGRESS NOTES
{PROVIDER CHARTING PREFERENCE:001096}    Arnulfo Fajardo is a 37 year old who presents for the following health issues {ACCOMPANIED BY STATEMENT (Optional):190802}    History of Present Illness       Reason for visit:  Physical and ears itch    She eats 2-3 servings of fruits and vegetables daily.She consumes 0 sweetened beverage(s) daily.She exercises with enough effort to increase her heart rate 30 to 60 minutes per day.  She exercises with enough effort to increase her heart rate 4 days per week.   She is taking medications regularly.       {SUPERLIST (Optional):981757}  {additonal problems for provider to add (Optional):154626}    Review of Systems   {ROS COMP (Optional):190463}      Objective    There were no vitals taken for this visit.  There is no height or weight on file to calculate BMI.  Physical Exam   {Exam List (Optional):367753}    {Diagnostic Test Results (Optional):712510}    {AMBULATORY ATTESTATION (Optional):229187}

## 2022-06-11 LAB
C TRACH DNA SPEC QL NAA+PROBE: NEGATIVE
N GONORRHOEA DNA SPEC QL NAA+PROBE: NEGATIVE

## 2022-06-13 LAB
HSV1 IGG SERPL QL IA: 5.42 INDEX
HSV1 IGG SERPL QL IA: ABNORMAL
HSV2 IGG SERPL QL IA: 0.12 INDEX
HSV2 IGG SERPL QL IA: ABNORMAL

## 2023-01-08 ENCOUNTER — HEALTH MAINTENANCE LETTER (OUTPATIENT)
Age: 38
End: 2023-01-08

## 2023-07-16 ENCOUNTER — HEALTH MAINTENANCE LETTER (OUTPATIENT)
Age: 38
End: 2023-07-16

## 2024-02-11 ENCOUNTER — HEALTH MAINTENANCE LETTER (OUTPATIENT)
Age: 39
End: 2024-02-11

## 2024-09-08 ENCOUNTER — HEALTH MAINTENANCE LETTER (OUTPATIENT)
Age: 39
End: 2024-09-08